# Patient Record
Sex: FEMALE | HISPANIC OR LATINO | Employment: OTHER | URBAN - NONMETROPOLITAN AREA
[De-identification: names, ages, dates, MRNs, and addresses within clinical notes are randomized per-mention and may not be internally consistent; named-entity substitution may affect disease eponyms.]

---

## 2017-01-18 DIAGNOSIS — I10 ESSENTIAL HYPERTENSION: ICD-10-CM

## 2017-01-18 DIAGNOSIS — E78.2 MIXED HYPERLIPIDEMIA: ICD-10-CM

## 2017-01-19 RX ORDER — LISINOPRIL 2.5 MG/1
2.5 TABLET ORAL DAILY
Qty: 90 TABLET | Refills: 3 | Status: SHIPPED | OUTPATIENT
Start: 2017-01-19 | End: 2017-08-24 | Stop reason: SDUPTHER

## 2017-06-06 ENCOUNTER — OFFICE VISIT (OUTPATIENT)
Dept: NEUROSURGERY | Facility: CLINIC | Age: 72
End: 2017-06-06

## 2017-06-06 VITALS
DIASTOLIC BLOOD PRESSURE: 78 MMHG | HEIGHT: 63 IN | WEIGHT: 164 LBS | SYSTOLIC BLOOD PRESSURE: 148 MMHG | BODY MASS INDEX: 29.06 KG/M2

## 2017-06-06 DIAGNOSIS — N28.1 RENAL CYST: ICD-10-CM

## 2017-06-06 DIAGNOSIS — E66.3 OVERWEIGHT (BMI 25.0-29.9): ICD-10-CM

## 2017-06-06 DIAGNOSIS — M25.551 RIGHT HIP PAIN: Primary | ICD-10-CM

## 2017-06-06 PROCEDURE — 99204 OFFICE O/P NEW MOD 45 MIN: CPT | Performed by: NURSE PRACTITIONER

## 2017-06-06 RX ORDER — LISINOPRIL 2.5 MG/1
TABLET ORAL
Refills: 3 | COMMUNITY
Start: 2017-04-24 | End: 2019-11-15

## 2017-06-06 RX ORDER — CLOPIDOGREL BISULFATE 75 MG/1
TABLET ORAL
Refills: 3 | COMMUNITY
Start: 2017-04-24

## 2017-06-06 RX ORDER — BUPROPION HYDROCHLORIDE 150 MG/1
TABLET, EXTENDED RELEASE ORAL
Refills: 3 | COMMUNITY
Start: 2017-04-19

## 2017-06-06 RX ORDER — ISOSORBIDE MONONITRATE 30 MG/1
TABLET, EXTENDED RELEASE ORAL
Refills: 3 | COMMUNITY
Start: 2017-05-03

## 2017-06-06 RX ORDER — ATORVASTATIN CALCIUM 40 MG/1
TABLET, FILM COATED ORAL
Refills: 3 | COMMUNITY
Start: 2017-05-03

## 2017-08-02 ENCOUNTER — TELEPHONE (OUTPATIENT)
Dept: CARDIOLOGY | Age: 72
End: 2017-08-02

## 2017-08-24 ENCOUNTER — OFFICE VISIT (OUTPATIENT)
Dept: CARDIOLOGY | Age: 72
End: 2017-08-24
Payer: MEDICARE

## 2017-08-24 VITALS
HEART RATE: 66 BPM | DIASTOLIC BLOOD PRESSURE: 78 MMHG | SYSTOLIC BLOOD PRESSURE: 120 MMHG | WEIGHT: 164 LBS | HEIGHT: 63 IN | BODY MASS INDEX: 29.06 KG/M2

## 2017-08-24 DIAGNOSIS — I25.10 CORONARY ARTERY DISEASE INVOLVING NATIVE CORONARY ARTERY OF NATIVE HEART WITHOUT ANGINA PECTORIS: ICD-10-CM

## 2017-08-24 DIAGNOSIS — I20.9 ANGINA PECTORIS (HCC): ICD-10-CM

## 2017-08-24 DIAGNOSIS — I10 ESSENTIAL HYPERTENSION: ICD-10-CM

## 2017-08-24 DIAGNOSIS — E78.2 MIXED HYPERLIPIDEMIA: ICD-10-CM

## 2017-08-24 PROCEDURE — 3017F COLORECTAL CA SCREEN DOC REV: CPT | Performed by: INTERNAL MEDICINE

## 2017-08-24 PROCEDURE — 4040F PNEUMOC VAC/ADMIN/RCVD: CPT | Performed by: INTERNAL MEDICINE

## 2017-08-24 PROCEDURE — 1036F TOBACCO NON-USER: CPT | Performed by: INTERNAL MEDICINE

## 2017-08-24 PROCEDURE — 99213 OFFICE O/P EST LOW 20 MIN: CPT | Performed by: INTERNAL MEDICINE

## 2017-08-24 PROCEDURE — 3014F SCREEN MAMMO DOC REV: CPT | Performed by: INTERNAL MEDICINE

## 2017-08-24 PROCEDURE — G8598 ASA/ANTIPLAT THER USED: HCPCS | Performed by: INTERNAL MEDICINE

## 2017-08-24 PROCEDURE — G8419 CALC BMI OUT NRM PARAM NOF/U: HCPCS | Performed by: INTERNAL MEDICINE

## 2017-08-24 PROCEDURE — G8400 PT W/DXA NO RESULTS DOC: HCPCS | Performed by: INTERNAL MEDICINE

## 2017-08-24 PROCEDURE — 1090F PRES/ABSN URINE INCON ASSESS: CPT | Performed by: INTERNAL MEDICINE

## 2017-08-24 PROCEDURE — 1123F ACP DISCUSS/DSCN MKR DOCD: CPT | Performed by: INTERNAL MEDICINE

## 2017-08-24 PROCEDURE — G8427 DOCREV CUR MEDS BY ELIG CLIN: HCPCS | Performed by: INTERNAL MEDICINE

## 2017-08-24 RX ORDER — ATORVASTATIN CALCIUM 40 MG/1
40 TABLET, FILM COATED ORAL DAILY
Qty: 90 TABLET | Refills: 3 | Status: SHIPPED | OUTPATIENT
Start: 2017-08-24 | End: 2018-05-21 | Stop reason: SDUPTHER

## 2017-08-24 RX ORDER — BUPROPION HYDROCHLORIDE 150 MG/1
TABLET, EXTENDED RELEASE ORAL
COMMUNITY
Start: 2017-07-15 | End: 2021-10-27

## 2017-08-24 RX ORDER — LISINOPRIL 2.5 MG/1
2.5 TABLET ORAL DAILY
Qty: 90 TABLET | Refills: 3 | Status: SHIPPED | OUTPATIENT
Start: 2017-08-24 | End: 2018-08-24 | Stop reason: SDUPTHER

## 2017-08-24 RX ORDER — ISOSORBIDE MONONITRATE 30 MG/1
30 TABLET, EXTENDED RELEASE ORAL DAILY
Qty: 90 TABLET | Refills: 3 | Status: SHIPPED | OUTPATIENT
Start: 2017-08-24 | End: 2018-05-21 | Stop reason: SDUPTHER

## 2017-08-24 RX ORDER — NITROGLYCERIN 0.4 MG/1
0.4 TABLET SUBLINGUAL EVERY 5 MIN PRN
Qty: 25 TABLET | Refills: 5 | Status: SHIPPED | OUTPATIENT
Start: 2017-08-24 | End: 2019-09-24 | Stop reason: SDUPTHER

## 2017-08-24 RX ORDER — CLOPIDOGREL BISULFATE 75 MG/1
75 TABLET ORAL DAILY
Qty: 90 TABLET | Refills: 3 | Status: SHIPPED | OUTPATIENT
Start: 2017-08-24 | End: 2018-05-21 | Stop reason: SDUPTHER

## 2017-09-12 ENCOUNTER — HOSPITAL ENCOUNTER (OUTPATIENT)
Dept: NON INVASIVE DIAGNOSTICS | Age: 72
Discharge: HOME | End: 2017-09-12
Payer: MEDICARE

## 2017-09-12 LAB
LV EF: 50 %
LVEF MODALITY: NORMAL

## 2017-09-12 PROCEDURE — 93350 STRESS TTE ONLY: CPT

## 2017-10-20 DIAGNOSIS — I10 ESSENTIAL HYPERTENSION: ICD-10-CM

## 2017-10-20 DIAGNOSIS — I25.10 CORONARY ARTERY DISEASE INVOLVING NATIVE CORONARY ARTERY OF NATIVE HEART WITHOUT ANGINA PECTORIS: ICD-10-CM

## 2017-10-20 DIAGNOSIS — E78.2 MIXED HYPERLIPIDEMIA: ICD-10-CM

## 2017-10-20 RX ORDER — ATORVASTATIN CALCIUM 40 MG/1
40 TABLET, FILM COATED ORAL DAILY
Qty: 90 TABLET | Refills: 0 | Status: SHIPPED | OUTPATIENT
Start: 2017-10-20 | End: 2018-08-24 | Stop reason: SDUPTHER

## 2017-10-20 RX ORDER — ISOSORBIDE MONONITRATE 30 MG/1
30 TABLET, EXTENDED RELEASE ORAL DAILY
Qty: 90 TABLET | Refills: 2 | Status: SHIPPED | OUTPATIENT
Start: 2017-10-20 | End: 2018-08-24 | Stop reason: SDUPTHER

## 2017-10-20 RX ORDER — CLOPIDOGREL BISULFATE 75 MG/1
75 TABLET ORAL DAILY
Qty: 90 TABLET | Refills: 2 | Status: SHIPPED | OUTPATIENT
Start: 2017-10-20 | End: 2018-07-09 | Stop reason: SDUPTHER

## 2018-05-21 ENCOUNTER — HOSPITAL ENCOUNTER (OUTPATIENT)
Dept: PAIN MANAGEMENT | Age: 73
Discharge: HOME | End: 2018-05-21
Payer: MEDICARE

## 2018-05-21 VITALS
DIASTOLIC BLOOD PRESSURE: 81 MMHG | HEIGHT: 63 IN | RESPIRATION RATE: 18 BRPM | HEART RATE: 66 BPM | BODY MASS INDEX: 28 KG/M2 | SYSTOLIC BLOOD PRESSURE: 138 MMHG | OXYGEN SATURATION: 98 % | TEMPERATURE: 97.9 F | WEIGHT: 158 LBS

## 2018-05-21 DIAGNOSIS — M70.61 TROCHANTERIC BURSITIS OF RIGHT HIP: ICD-10-CM

## 2018-05-21 DIAGNOSIS — G89.29 CHRONIC RIGHT-SIDED LOW BACK PAIN WITHOUT SCIATICA: ICD-10-CM

## 2018-05-21 DIAGNOSIS — M53.3 SACROILIAC JOINT DYSFUNCTION OF RIGHT SIDE: ICD-10-CM

## 2018-05-21 DIAGNOSIS — M62.830 MUSCLE SPASM OF BACK: ICD-10-CM

## 2018-05-21 DIAGNOSIS — M54.50 CHRONIC RIGHT-SIDED LOW BACK PAIN WITHOUT SCIATICA: ICD-10-CM

## 2018-05-21 PROCEDURE — 99214 OFFICE O/P EST MOD 30 MIN: CPT | Performed by: NURSE PRACTITIONER

## 2018-05-21 PROCEDURE — 99205 OFFICE O/P NEW HI 60 MIN: CPT

## 2018-05-21 RX ORDER — EMOLLIENT BASE
CREAM (GRAM) TOPICAL
Qty: 300 G | Refills: 5 | Status: SHIPPED | OUTPATIENT
Start: 2018-05-21 | End: 2020-09-22

## 2018-05-21 RX ORDER — HYDROCODONE BITARTRATE AND ACETAMINOPHEN 5; 325 MG/1; MG/1
TABLET ORAL
Qty: 60 TABLET | Refills: 0 | Status: SHIPPED | OUTPATIENT
Start: 2018-05-21 | End: 2018-06-20

## 2018-05-21 RX ORDER — GABAPENTIN 100 MG/1
100 CAPSULE ORAL 3 TIMES DAILY
Qty: 90 CAPSULE | Refills: 2 | Status: SHIPPED | OUTPATIENT
Start: 2018-05-21 | End: 2019-09-24

## 2018-07-03 ENCOUNTER — TELEPHONE (OUTPATIENT)
Dept: CARDIOLOGY | Age: 73
End: 2018-07-03

## 2018-07-09 DIAGNOSIS — I25.10 CORONARY ARTERY DISEASE INVOLVING NATIVE CORONARY ARTERY OF NATIVE HEART WITHOUT ANGINA PECTORIS: ICD-10-CM

## 2018-07-09 DIAGNOSIS — I10 ESSENTIAL HYPERTENSION: ICD-10-CM

## 2018-07-09 RX ORDER — CLOPIDOGREL BISULFATE 75 MG/1
75 TABLET ORAL DAILY
Qty: 90 TABLET | Refills: 3 | Status: SHIPPED | OUTPATIENT
Start: 2018-07-09 | End: 2018-08-24 | Stop reason: SDUPTHER

## 2018-07-16 ENCOUNTER — HOSPITAL ENCOUNTER (OUTPATIENT)
Dept: PAIN MANAGEMENT | Age: 73
Discharge: HOME | End: 2018-07-16
Payer: MEDICARE

## 2018-07-16 VITALS
HEIGHT: 63 IN | BODY MASS INDEX: 27.29 KG/M2 | SYSTOLIC BLOOD PRESSURE: 137 MMHG | OXYGEN SATURATION: 98 % | HEART RATE: 67 BPM | DIASTOLIC BLOOD PRESSURE: 73 MMHG | TEMPERATURE: 96.8 F | WEIGHT: 154 LBS

## 2018-07-16 DIAGNOSIS — M70.61 TROCHANTERIC BURSITIS OF RIGHT HIP: ICD-10-CM

## 2018-07-16 DIAGNOSIS — G89.29 CHRONIC RIGHT-SIDED LOW BACK PAIN WITHOUT SCIATICA: ICD-10-CM

## 2018-07-16 DIAGNOSIS — M54.50 CHRONIC RIGHT-SIDED LOW BACK PAIN WITHOUT SCIATICA: ICD-10-CM

## 2018-07-16 DIAGNOSIS — M53.3 SACROILIAC JOINT DYSFUNCTION OF RIGHT SIDE: ICD-10-CM

## 2018-07-16 DIAGNOSIS — M62.830 MUSCLE SPASM OF BACK: ICD-10-CM

## 2018-07-16 LAB — SUMMARY COMPLIANCE DRUG ANAL, UR: NORMAL ML

## 2018-07-16 PROCEDURE — 80307 DRUG TEST PRSMV CHEM ANLYZR: CPT

## 2018-07-16 PROCEDURE — 99213 OFFICE O/P EST LOW 20 MIN: CPT

## 2018-07-16 PROCEDURE — 99213 OFFICE O/P EST LOW 20 MIN: CPT | Performed by: NURSE PRACTITIONER

## 2018-07-16 ASSESSMENT — PAIN DESCRIPTION - LOCATION: LOCATION: BACK

## 2018-07-16 ASSESSMENT — PAIN DESCRIPTION - PAIN TYPE: TYPE: CHRONIC PAIN

## 2018-07-16 ASSESSMENT — PAIN SCALES - GENERAL: PAINLEVEL_OUTOF10: 2

## 2018-07-16 NOTE — PROGRESS NOTES
Medication Sig Dispense Refill    clopidogrel (PLAVIX) 75 MG tablet TAKE 1 TABLET BY MOUTH DAILY 90 tablet 3    Cream Base CREA [place] Pain Cream #4 (Please remove Ibuprofen) -  Apply to affected area 3-4 times a day 300 g 5    gabapentin (NEURONTIN) 100 MG capsule Take 1 capsule by mouth 3 times daily for 90 days. . 90 capsule 2    isosorbide mononitrate (IMDUR) 30 MG extended release tablet TAKE 1 TABLET BY MOUTH DAILY 90 tablet 2    atorvastatin (LIPITOR) 40 MG tablet TAKE 1 TABLET BY MOUTH DAILY 90 tablet 0    buPROPion (WELLBUTRIN SR) 150 MG extended release tablet       lisinopril (PRINIVIL;ZESTRIL) 2.5 MG tablet Take 1 tablet by mouth daily 90 tablet 3    metoprolol tartrate (LOPRESSOR) 25 MG tablet Take 0.5 tablets by mouth 2 times daily 90 tablet 3    nitroGLYCERIN (NITROSTAT) 0.4 MG SL tablet Place 1 tablet under the tongue every 5 minutes as needed for Chest pain 25 tablet 5    famotidine (PEPCID) 20 MG tablet Take 20 mg by mouth daily. No current facility-administered medications for this encounter. Social History    Social History   Substance Use Topics    Smoking status: Former Smoker     Types: Cigarettes     Quit date: 8/1/2012    Smokeless tobacco: Never Used    Alcohol use No         Family History  family history includes Cancer in her sister; Heart Disease in her father and mother; Stroke in her father.     Review of Systems:    ROS    14 point ROS negative besides that noted in HPI    Complains of constipation: negative    Current Pain Assessment:   Pain Assessment  Pain Assessment: 0-10  Pain Level: 2  Pain Type: Chronic pain  Pain Location: Back  Pain Radiating Towards: radiates to right leg  pt states she completed pt cant say if helped because she is better less pain      Recent Imaging: MRI of lumbar spine 4/28/17    Physical Exam:    Vitals:    07/16/18 1039   BP: 137/73   Pulse: 67   Temp: 96.8 °F (36 °C)   SpO2: 98%   Weight: 154 lb (69.9 kg)   Height: 5' risk of tolerance, dependence and alternative treatments. Discussed the growing epidemic in the [place] with the overprescribing and at times the abuse of narcotics. Discussed the detrimental effects of long term narcotic use. Patient encouraged to set daily goals of exercising and decreasing daily narcotic intake. Discussed with the patient about the development of hyperalgesia with long term narcotic intake.      CC:  DO Octavia Garland APRN, 7/16/2018 at 11:05 AM

## 2018-08-24 ENCOUNTER — OFFICE VISIT (OUTPATIENT)
Dept: CARDIOLOGY | Age: 73
End: 2018-08-24
Payer: MEDICARE

## 2018-08-24 VITALS
BODY MASS INDEX: 28.16 KG/M2 | HEART RATE: 68 BPM | DIASTOLIC BLOOD PRESSURE: 80 MMHG | WEIGHT: 153 LBS | HEIGHT: 62 IN | SYSTOLIC BLOOD PRESSURE: 130 MMHG

## 2018-08-24 DIAGNOSIS — I25.10 CORONARY ARTERY DISEASE INVOLVING NATIVE CORONARY ARTERY OF NATIVE HEART WITHOUT ANGINA PECTORIS: ICD-10-CM

## 2018-08-24 DIAGNOSIS — I10 ESSENTIAL HYPERTENSION: Primary | ICD-10-CM

## 2018-08-24 DIAGNOSIS — E78.2 MIXED HYPERLIPIDEMIA: ICD-10-CM

## 2018-08-24 PROBLEM — N28.1 RENAL CYST: Status: ACTIVE | Noted: 2017-06-06

## 2018-08-24 PROBLEM — E66.3 OVERWEIGHT (BMI 25.0-29.9): Status: ACTIVE | Noted: 2017-06-06

## 2018-08-24 PROBLEM — M25.551 RIGHT HIP PAIN: Status: ACTIVE | Noted: 2017-06-06

## 2018-08-24 PROCEDURE — 1101F PT FALLS ASSESS-DOCD LE1/YR: CPT | Performed by: NURSE PRACTITIONER

## 2018-08-24 PROCEDURE — 1123F ACP DISCUSS/DSCN MKR DOCD: CPT | Performed by: NURSE PRACTITIONER

## 2018-08-24 PROCEDURE — 4040F PNEUMOC VAC/ADMIN/RCVD: CPT | Performed by: NURSE PRACTITIONER

## 2018-08-24 PROCEDURE — 1090F PRES/ABSN URINE INCON ASSESS: CPT | Performed by: NURSE PRACTITIONER

## 2018-08-24 PROCEDURE — 99213 OFFICE O/P EST LOW 20 MIN: CPT | Performed by: NURSE PRACTITIONER

## 2018-08-24 PROCEDURE — G8400 PT W/DXA NO RESULTS DOC: HCPCS | Performed by: NURSE PRACTITIONER

## 2018-08-24 PROCEDURE — G8598 ASA/ANTIPLAT THER USED: HCPCS | Performed by: NURSE PRACTITIONER

## 2018-08-24 PROCEDURE — 3017F COLORECTAL CA SCREEN DOC REV: CPT | Performed by: NURSE PRACTITIONER

## 2018-08-24 PROCEDURE — 93000 ELECTROCARDIOGRAM COMPLETE: CPT | Performed by: NURSE PRACTITIONER

## 2018-08-24 PROCEDURE — 1036F TOBACCO NON-USER: CPT | Performed by: NURSE PRACTITIONER

## 2018-08-24 PROCEDURE — G8419 CALC BMI OUT NRM PARAM NOF/U: HCPCS | Performed by: NURSE PRACTITIONER

## 2018-08-24 PROCEDURE — G8427 DOCREV CUR MEDS BY ELIG CLIN: HCPCS | Performed by: NURSE PRACTITIONER

## 2018-08-24 RX ORDER — ISOSORBIDE MONONITRATE 30 MG/1
30 TABLET, EXTENDED RELEASE ORAL DAILY
Qty: 90 TABLET | Refills: 2 | Status: SHIPPED | OUTPATIENT
Start: 2018-08-24 | End: 2019-06-26 | Stop reason: SDUPTHER

## 2018-08-24 RX ORDER — CLOPIDOGREL BISULFATE 75 MG/1
75 TABLET ORAL DAILY
Qty: 90 TABLET | Refills: 3 | Status: SHIPPED | OUTPATIENT
Start: 2018-08-24 | End: 2019-09-24 | Stop reason: SDUPTHER

## 2018-08-24 RX ORDER — LISINOPRIL 2.5 MG/1
2.5 TABLET ORAL DAILY
Qty: 90 TABLET | Refills: 3 | Status: SHIPPED | OUTPATIENT
Start: 2018-08-24 | End: 2019-09-24 | Stop reason: SINTOL

## 2018-08-24 RX ORDER — ATORVASTATIN CALCIUM 40 MG/1
40 TABLET, FILM COATED ORAL DAILY
Qty: 90 TABLET | Refills: 3 | Status: SHIPPED | OUTPATIENT
Start: 2018-08-24 | End: 2019-09-24 | Stop reason: SDUPTHER

## 2018-08-24 NOTE — PROGRESS NOTES
Dear Jordan Cedeño DO & Gill Oden DO,    Thank you for allowing me to participate in the care of Ms. Stacey Way. She presents today at the [address] in [place]. As you know, Ms. Roshni Dudley is a 71 y.o. female with history of hypertension, hyperlipidemia,  and CAD who presents with the chief complaint of yearly follow up. She is a patient of Dr. Shayan Tuttle. HTN-AceI, BB-Runs well at home   HLD-statin, PCP manages  CAD/ CABG  4V-statin, plavix, Imdur. Stress test  was negative. Last cath . No ASA due to allergy    She otherwise denies chest pain, SOA, KWAN, PND, orthopnea, syncope or near syncope. She has no other complaints. Review of Systems    Constitutional: Negative for fever, chills, diaphoresis, activity change, appetite change, fatigue and unexpected weight change. Eyes: Negative for photophobia, pain, redness and visual disturbance. Respiratory: Negative for apnea, cough, chest tightness, shortness of breath, wheezing and stridor. Cardiovascular: Negative for chest pain, palpitations and leg swelling. Gastrointestinal: Negative for abdominal distention. Genitourinary: Negative for dysuria, urgency and frequency. Musculoskeletal: Negative for myalgias, arthralgias and gait problem. Skin: Negative for color change, pallor, rash and wound. Neurological: Negative for dizziness, tremors, speech difficulty, weakness and numbness. +headaches  Hematological: Does not bruise/bleed easily. Psychiatric/Behavioral: Negative.         Past Medical History:   Diagnosis Date    CAD (coronary artery disease)     GERD (gastroesophageal reflux disease)     Hyperlipidemia     PCP manages cholesterol/labs    Hypertension     Substance abuse     Tobacco       Past Surgical History:   Procedure Laterality Date    CARDIAC CATHETERIZATION  12  MDL    EF  60%    CARDIAC CATHETERIZATION  12  MDL    with stenting to RCA     SECTION  1973    CORONARY ARTERY BYPASS GRAFT  8/2/12    LIMA to LAD, sequential SVG to OM and Diag, SVG to PDA    HYSTERECTOMY  1984    Partial    OVARIAN CYST REMOVAL  1990       Family History   Problem Relation Age of Onset    Heart Disease Mother     Heart Disease Father     Stroke Father     Cancer Sister        Social History     Social History    Marital status:      Spouse name: N/A    Number of children: N/A    Years of education: N/A     Occupational History    Not on file.      Social History Main Topics    Smoking status: Former Smoker     Types: Cigarettes     Quit date: 8/1/2012    Smokeless tobacco: Never Used    Alcohol use No    Drug use: No    Sexual activity: Not Currently     Other Topics Concern    Not on file     Social History Narrative    No narrative on file       Allergies   Allergen Reactions    Asa Arthritis Strength-Antacid [Aspirin Buff, Al Hyd-Mg Hyd] Swelling    Ciprofloxacin     Demerol Swelling    Ibuprofen Swelling    Novocain [Procaine] Swelling         Current Outpatient Prescriptions:     clopidogrel (PLAVIX) 75 MG tablet, TAKE 1 TABLET BY MOUTH DAILY, Disp: 90 tablet, Rfl: 3    Cream Base CREA, [place] Pain Cream #4 (Please remove Ibuprofen) -  Apply to affected area 3-4 times a day, Disp: 300 g, Rfl: 5    isosorbide mononitrate (IMDUR) 30 MG extended release tablet, TAKE 1 TABLET BY MOUTH DAILY, Disp: 90 tablet, Rfl: 2    atorvastatin (LIPITOR) 40 MG tablet, TAKE 1 TABLET BY MOUTH DAILY, Disp: 90 tablet, Rfl: 0    buPROPion (WELLBUTRIN SR) 150 MG extended release tablet, , Disp: , Rfl:     lisinopril (PRINIVIL;ZESTRIL) 2.5 MG tablet, Take 1 tablet by mouth daily, Disp: 90 tablet, Rfl: 3    metoprolol tartrate (LOPRESSOR) 25 MG tablet, Take 0.5 tablets by mouth 2 times daily, Disp: 90 tablet, Rfl: 3    nitroGLYCERIN (NITROSTAT) 0.4 MG SL tablet, Place 1 tablet under the tongue every 5 minutes as needed for Chest pain, Disp: 25 tablet, Rfl: 5    famotidine (PEPCID) 20 MG tablet, Take 20 mg by mouth daily. , Disp: , Rfl:     gabapentin (NEURONTIN) 100 MG capsule, Take 1 capsule by mouth 3 times daily for 90 days. ., Disp: 90 capsule, Rfl: 2    PE:  Vitals:    08/24/18 1051   BP: 130/80   Pulse: 68       Estimated body mass index is 27.98 kg/m² as calculated from the following:    Height as of this encounter: 5' 2\" (1.575 m). Weight as of this encounter: 153 lb (69.4 kg). Constitutional: She is oriented to person, place, and time. She appears well-developed and well-nourished in no acute distress. Head: Normocephalic and atraumatic. Neck:  Neck supple without JVD present. Cardiovascular: Normal rate, regular rhythm, normal heart sounds. no murmur ascultated. No gallop and no friction rub.  no carotid bruits. no peripheral edema. Pulmonary/Chest:  Lungs clear to auscultation bilaterally without evidence of respiratory distress. She without wheezes. She without rales or ronchi. Musculoskeletal: Normal range of motion. Gait is normal no assitive device. Neurological: She is alert and oriented to person, place, and time. Skin: Skin is warm and dry without rash or pallor. Psychiatric: She has a normal mood and affect. Her behavior is normal. Thought content normal.     Lab Results   Component Value Date    CREATININE 0.8 08/21/2012    CREATININE 0.6 08/08/2012    CREATININE 0.6 08/07/2012    HGB 11.7 08/21/2012    HGB 8.8 08/08/2012    HGB 9.2 08/07/2012       ECG 08/24/18  Normal sinus rhythm and 68 BPM, nonspecific T abnormality         Assessment    1. Essential hypertension    2. Coronary artery disease involving native coronary artery of native heart without angina pectoris    3.  Mixed hyperlipidemia          Plan:      HTN-controlled, no change  HLD-PCP manages  CAD-allergic to ASA, on plavix, statin      Disposition - RTC in 12 months Dr. Marine Jones  or sooner if needed    Please do not hesitate to contact me for any

## 2019-06-11 DIAGNOSIS — R71.8 ABNORMAL RED BLOOD CELLS: Primary | ICD-10-CM

## 2019-06-14 ENCOUNTER — APPOINTMENT (OUTPATIENT)
Dept: LAB | Facility: HOSPITAL | Age: 74
End: 2019-06-14

## 2019-06-14 ENCOUNTER — CONSULT (OUTPATIENT)
Dept: ONCOLOGY | Facility: CLINIC | Age: 74
End: 2019-06-14

## 2019-06-14 VITALS
RESPIRATION RATE: 16 BRPM | TEMPERATURE: 98.1 F | DIASTOLIC BLOOD PRESSURE: 82 MMHG | BODY MASS INDEX: 30 KG/M2 | HEART RATE: 80 BPM | HEIGHT: 62 IN | OXYGEN SATURATION: 99 % | SYSTOLIC BLOOD PRESSURE: 142 MMHG

## 2019-06-14 DIAGNOSIS — R71.8 RED BLOOD CELL ABNORMALITY: Primary | ICD-10-CM

## 2019-06-14 DIAGNOSIS — Z13.30 ENCOUNTER FOR BEHAVIORAL HEALTH SCREENING: ICD-10-CM

## 2019-06-14 DIAGNOSIS — D50.9 IRON DEFICIENCY ANEMIA, UNSPECIFIED IRON DEFICIENCY ANEMIA TYPE: Primary | ICD-10-CM

## 2019-06-14 DIAGNOSIS — R71.8 ABNORMAL RED BLOOD CELLS: Primary | ICD-10-CM

## 2019-06-14 LAB
ALBUMIN SERPL-MCNC: 4.4 G/DL (ref 3.5–5)
ALBUMIN/GLOB SERPL: 1.5 G/DL (ref 1.1–2.5)
ALP SERPL-CCNC: 145 U/L (ref 24–120)
ALT SERPL W P-5'-P-CCNC: 31 U/L (ref 0–54)
ANION GAP SERPL CALCULATED.3IONS-SCNC: 8 MMOL/L (ref 4–13)
AST SERPL-CCNC: 38 U/L (ref 7–45)
BASOPHILS # BLD AUTO: 0.06 10*3/MM3 (ref 0–0.2)
BASOPHILS NFR BLD AUTO: 0.8 % (ref 0–2)
BILIRUB SERPL-MCNC: 0.6 MG/DL (ref 0.1–1)
BUN BLD-MCNC: 17 MG/DL (ref 5–21)
BUN/CREAT SERPL: 22.1 (ref 7–25)
CALCIUM SPEC-SCNC: 9.5 MG/DL (ref 8.4–10.4)
CHLORIDE SERPL-SCNC: 106 MMOL/L (ref 98–110)
CO2 SERPL-SCNC: 26 MMOL/L (ref 24–31)
CREAT BLD-MCNC: 0.77 MG/DL (ref 0.5–1.4)
DEPRECATED RDW RBC AUTO: 37.8 FL (ref 40–54)
EOSINOPHIL # BLD AUTO: 0.28 10*3/MM3 (ref 0–0.7)
EOSINOPHIL NFR BLD AUTO: 3.7 % (ref 0–4)
ERYTHROCYTE [DISTWIDTH] IN BLOOD BY AUTOMATED COUNT: 16 % (ref 12–15)
FERRITIN SERPL-MCNC: 198 NG/ML (ref 11.1–264)
FOLATE SERPL-MCNC: 12.2 NG/ML (ref 4.78–24.2)
GFR SERPL CREATININE-BSD FRML MDRD: 74 ML/MIN/1.73
GFR SERPL CREATININE-BSD FRML MDRD: 89 ML/MIN/1.73
GLOBULIN UR ELPH-MCNC: 3 GM/DL
GLUCOSE BLD-MCNC: 120 MG/DL (ref 70–100)
HCT VFR BLD AUTO: 40.3 % (ref 37–47)
HGB BLD-MCNC: 12.7 G/DL (ref 12–16)
HOLD SPECIMEN: NORMAL
IRON 24H UR-MRATE: 89 MCG/DL (ref 42–180)
IRON SATN MFR SERPL: 31 % (ref 20–45)
LYMPHOCYTES # BLD AUTO: 1.61 10*3/MM3 (ref 0.72–4.86)
LYMPHOCYTES NFR BLD AUTO: 21.5 % (ref 15–45)
MCH RBC QN AUTO: 21.7 PG (ref 28–32)
MCHC RBC AUTO-ENTMCNC: 31.5 G/DL (ref 33–36)
MCV RBC AUTO: 68.9 FL (ref 82–98)
MONOCYTES # BLD AUTO: 0.46 10*3/MM3 (ref 0.19–1.3)
MONOCYTES NFR BLD AUTO: 6.1 % (ref 4–12)
NEUTROPHILS # BLD AUTO: 5.07 10*3/MM3 (ref 1.87–8.4)
NEUTROPHILS NFR BLD AUTO: 67.6 % (ref 39–78)
PLATELET # BLD AUTO: 299 10*3/MM3 (ref 130–400)
PMV BLD AUTO: 9.6 FL (ref 6–12)
POTASSIUM BLD-SCNC: 4.4 MMOL/L (ref 3.5–5.3)
PROT SERPL-MCNC: 7.4 G/DL (ref 6.3–8.7)
RBC # BLD AUTO: 5.85 10*6/MM3 (ref 4.2–5.4)
SODIUM BLD-SCNC: 140 MMOL/L (ref 135–145)
TIBC SERPL-MCNC: 285 MCG/DL (ref 225–420)
VIT B12 BLD-MCNC: 948 PG/ML (ref 239–931)
WBC NRBC COR # BLD: 7.5 10*3/MM3 (ref 4.8–10.8)
WHOLE BLOOD HOLD SPECIMEN: NORMAL

## 2019-06-14 PROCEDURE — 80053 COMPREHEN METABOLIC PANEL: CPT | Performed by: INTERNAL MEDICINE

## 2019-06-14 PROCEDURE — 82746 ASSAY OF FOLIC ACID SERUM: CPT | Performed by: INTERNAL MEDICINE

## 2019-06-14 PROCEDURE — 82728 ASSAY OF FERRITIN: CPT | Performed by: INTERNAL MEDICINE

## 2019-06-14 PROCEDURE — 36415 COLL VENOUS BLD VENIPUNCTURE: CPT | Performed by: INTERNAL MEDICINE

## 2019-06-14 PROCEDURE — 82607 VITAMIN B-12: CPT | Performed by: INTERNAL MEDICINE

## 2019-06-14 PROCEDURE — 83550 IRON BINDING TEST: CPT | Performed by: INTERNAL MEDICINE

## 2019-06-14 PROCEDURE — 99205 OFFICE O/P NEW HI 60 MIN: CPT | Performed by: INTERNAL MEDICINE

## 2019-06-14 PROCEDURE — 85025 COMPLETE CBC W/AUTO DIFF WBC: CPT | Performed by: INTERNAL MEDICINE

## 2019-06-14 PROCEDURE — 83540 ASSAY OF IRON: CPT | Performed by: INTERNAL MEDICINE

## 2019-06-14 RX ORDER — FAMOTIDINE 20 MG/1
20 TABLET, FILM COATED ORAL 2 TIMES DAILY
COMMUNITY

## 2019-06-14 RX ORDER — NITROGLYCERIN 0.4 MG/1
0.4 TABLET SUBLINGUAL
COMMUNITY

## 2019-06-25 DIAGNOSIS — D50.9 IRON DEFICIENCY ANEMIA, UNSPECIFIED IRON DEFICIENCY ANEMIA TYPE: Primary | ICD-10-CM

## 2019-06-26 ENCOUNTER — APPOINTMENT (OUTPATIENT)
Dept: LAB | Facility: HOSPITAL | Age: 74
End: 2019-06-26

## 2019-06-26 ENCOUNTER — OFFICE VISIT (OUTPATIENT)
Dept: ONCOLOGY | Facility: CLINIC | Age: 74
End: 2019-06-26

## 2019-06-26 VITALS
BODY MASS INDEX: 28.91 KG/M2 | HEART RATE: 96 BPM | TEMPERATURE: 98.1 F | HEIGHT: 62 IN | RESPIRATION RATE: 16 BRPM | SYSTOLIC BLOOD PRESSURE: 142 MMHG | OXYGEN SATURATION: 98 % | WEIGHT: 157.1 LBS | DIASTOLIC BLOOD PRESSURE: 78 MMHG

## 2019-06-26 DIAGNOSIS — R71.8 ABNORMAL RBC: Primary | ICD-10-CM

## 2019-06-26 DIAGNOSIS — I25.10 CORONARY ARTERY DISEASE INVOLVING NATIVE CORONARY ARTERY OF NATIVE HEART WITHOUT ANGINA PECTORIS: ICD-10-CM

## 2019-06-26 LAB
ALBUMIN SERPL-MCNC: 4.4 G/DL (ref 3.5–5)
ALBUMIN/GLOB SERPL: 1.6 G/DL (ref 1.1–2.5)
ALP SERPL-CCNC: 129 U/L (ref 24–120)
ALT SERPL W P-5'-P-CCNC: 28 U/L (ref 0–54)
ANION GAP SERPL CALCULATED.3IONS-SCNC: 9 MMOL/L (ref 4–13)
AST SERPL-CCNC: 32 U/L (ref 7–45)
BASOPHILS # BLD AUTO: 0.07 10*3/MM3 (ref 0–0.2)
BASOPHILS NFR BLD AUTO: 1 % (ref 0–2)
BILIRUB SERPL-MCNC: 0.6 MG/DL (ref 0.1–1)
BUN BLD-MCNC: 14 MG/DL (ref 5–21)
BUN/CREAT SERPL: 18.7 (ref 7–25)
CALCIUM SPEC-SCNC: 9.3 MG/DL (ref 8.4–10.4)
CHLORIDE SERPL-SCNC: 106 MMOL/L (ref 98–110)
CO2 SERPL-SCNC: 26 MMOL/L (ref 24–31)
CREAT BLD-MCNC: 0.75 MG/DL (ref 0.5–1.4)
DEPRECATED RDW RBC AUTO: 38.9 FL (ref 40–54)
EOSINOPHIL # BLD AUTO: 0.25 10*3/MM3 (ref 0–0.7)
EOSINOPHIL NFR BLD AUTO: 3.4 % (ref 0–4)
ERYTHROCYTE [DISTWIDTH] IN BLOOD BY AUTOMATED COUNT: 16.6 % (ref 12–15)
GFR SERPL CREATININE-BSD FRML MDRD: 76 ML/MIN/1.73
GFR SERPL CREATININE-BSD FRML MDRD: 92 ML/MIN/1.73
GLOBULIN UR ELPH-MCNC: 2.7 GM/DL
GLUCOSE BLD-MCNC: 138 MG/DL (ref 70–100)
HCT VFR BLD AUTO: 39.7 % (ref 37–47)
HGB BLD-MCNC: 12.5 G/DL (ref 12–16)
LYMPHOCYTES # BLD AUTO: 2.33 10*3/MM3 (ref 0.72–4.86)
LYMPHOCYTES NFR BLD AUTO: 31.8 % (ref 15–45)
MCH RBC QN AUTO: 21.8 PG (ref 28–32)
MCHC RBC AUTO-ENTMCNC: 31.5 G/DL (ref 33–36)
MCV RBC AUTO: 69.3 FL (ref 82–98)
MONOCYTES # BLD AUTO: 0.34 10*3/MM3 (ref 0.19–1.3)
MONOCYTES NFR BLD AUTO: 4.6 % (ref 4–12)
NEUTROPHILS # BLD AUTO: 4.31 10*3/MM3 (ref 1.87–8.4)
NEUTROPHILS NFR BLD AUTO: 58.9 % (ref 39–78)
PLATELET # BLD AUTO: 249 10*3/MM3 (ref 130–400)
PMV BLD AUTO: 9.3 FL (ref 6–12)
POTASSIUM BLD-SCNC: 3.6 MMOL/L (ref 3.5–5.3)
PROT SERPL-MCNC: 7.1 G/DL (ref 6.3–8.7)
RBC # BLD AUTO: 5.73 10*6/MM3 (ref 4.2–5.4)
SODIUM BLD-SCNC: 141 MMOL/L (ref 135–145)
WBC NRBC COR # BLD: 7.32 10*3/MM3 (ref 4.8–10.8)

## 2019-06-26 PROCEDURE — 85025 COMPLETE CBC W/AUTO DIFF WBC: CPT | Performed by: INTERNAL MEDICINE

## 2019-06-26 PROCEDURE — 36415 COLL VENOUS BLD VENIPUNCTURE: CPT | Performed by: INTERNAL MEDICINE

## 2019-06-26 PROCEDURE — 80053 COMPREHEN METABOLIC PANEL: CPT | Performed by: INTERNAL MEDICINE

## 2019-06-26 PROCEDURE — 99213 OFFICE O/P EST LOW 20 MIN: CPT | Performed by: INTERNAL MEDICINE

## 2019-06-26 RX ORDER — ISOSORBIDE MONONITRATE 30 MG/1
30 TABLET, EXTENDED RELEASE ORAL DAILY
Qty: 90 TABLET | Refills: 0 | Status: SHIPPED | OUTPATIENT
Start: 2019-06-26 | End: 2019-09-24 | Stop reason: SDUPTHER

## 2019-06-28 ENCOUNTER — APPOINTMENT (OUTPATIENT)
Dept: LAB | Facility: HOSPITAL | Age: 74
End: 2019-06-28

## 2019-09-20 ENCOUNTER — APPOINTMENT (OUTPATIENT)
Dept: LAB | Facility: HOSPITAL | Age: 74
End: 2019-09-20

## 2019-09-24 ENCOUNTER — OFFICE VISIT (OUTPATIENT)
Dept: CARDIOLOGY | Age: 74
End: 2019-09-24
Payer: MEDICARE

## 2019-09-24 VITALS
SYSTOLIC BLOOD PRESSURE: 122 MMHG | HEART RATE: 68 BPM | WEIGHT: 157 LBS | DIASTOLIC BLOOD PRESSURE: 84 MMHG | BODY MASS INDEX: 28.89 KG/M2 | HEIGHT: 62 IN

## 2019-09-24 DIAGNOSIS — E78.2 MIXED HYPERLIPIDEMIA: ICD-10-CM

## 2019-09-24 DIAGNOSIS — I25.10 CORONARY ARTERY DISEASE INVOLVING NATIVE CORONARY ARTERY OF NATIVE HEART WITHOUT ANGINA PECTORIS: ICD-10-CM

## 2019-09-24 DIAGNOSIS — R05.8 ACE-INHIBITOR COUGH: ICD-10-CM

## 2019-09-24 DIAGNOSIS — I10 ESSENTIAL HYPERTENSION: Primary | ICD-10-CM

## 2019-09-24 DIAGNOSIS — T46.4X5A ACE-INHIBITOR COUGH: ICD-10-CM

## 2019-09-24 DIAGNOSIS — I20.9 ANGINA PECTORIS (HCC): ICD-10-CM

## 2019-09-24 PROCEDURE — 93000 ELECTROCARDIOGRAM COMPLETE: CPT | Performed by: CLINICAL NURSE SPECIALIST

## 2019-09-24 PROCEDURE — 99214 OFFICE O/P EST MOD 30 MIN: CPT | Performed by: CLINICAL NURSE SPECIALIST

## 2019-09-24 PROCEDURE — G8427 DOCREV CUR MEDS BY ELIG CLIN: HCPCS | Performed by: CLINICAL NURSE SPECIALIST

## 2019-09-24 PROCEDURE — G8400 PT W/DXA NO RESULTS DOC: HCPCS | Performed by: CLINICAL NURSE SPECIALIST

## 2019-09-24 PROCEDURE — 1090F PRES/ABSN URINE INCON ASSESS: CPT | Performed by: CLINICAL NURSE SPECIALIST

## 2019-09-24 PROCEDURE — G8598 ASA/ANTIPLAT THER USED: HCPCS | Performed by: CLINICAL NURSE SPECIALIST

## 2019-09-24 PROCEDURE — 1123F ACP DISCUSS/DSCN MKR DOCD: CPT | Performed by: CLINICAL NURSE SPECIALIST

## 2019-09-24 PROCEDURE — G8419 CALC BMI OUT NRM PARAM NOF/U: HCPCS | Performed by: CLINICAL NURSE SPECIALIST

## 2019-09-24 PROCEDURE — 4040F PNEUMOC VAC/ADMIN/RCVD: CPT | Performed by: CLINICAL NURSE SPECIALIST

## 2019-09-24 PROCEDURE — 3017F COLORECTAL CA SCREEN DOC REV: CPT | Performed by: CLINICAL NURSE SPECIALIST

## 2019-09-24 PROCEDURE — 1036F TOBACCO NON-USER: CPT | Performed by: CLINICAL NURSE SPECIALIST

## 2019-09-24 RX ORDER — ISOSORBIDE MONONITRATE 30 MG/1
30 TABLET, EXTENDED RELEASE ORAL DAILY
Qty: 90 TABLET | Refills: 0 | Status: SHIPPED | OUTPATIENT
Start: 2019-09-24 | End: 2019-12-20

## 2019-09-24 RX ORDER — ATORVASTATIN CALCIUM 40 MG/1
40 TABLET, FILM COATED ORAL DAILY
Qty: 90 TABLET | Refills: 3 | Status: SHIPPED | OUTPATIENT
Start: 2019-09-24 | End: 2020-09-16

## 2019-09-24 RX ORDER — CLOPIDOGREL BISULFATE 75 MG/1
75 TABLET ORAL DAILY
Qty: 90 TABLET | Refills: 3 | Status: SHIPPED | OUTPATIENT
Start: 2019-09-24 | End: 2020-09-16

## 2019-09-24 RX ORDER — NITROGLYCERIN 0.4 MG/1
0.4 TABLET SUBLINGUAL EVERY 5 MIN PRN
Qty: 25 TABLET | Refills: 5 | Status: SHIPPED | OUTPATIENT
Start: 2019-09-24 | End: 2022-10-27 | Stop reason: SDUPTHER

## 2019-09-24 NOTE — PROGRESS NOTES
and Novocain [procaine]    Review of Systems  Constitutional - no significant activity change, appetite change, or unexpected weight change. No fever, chills or diaphoresis. No fatigue. HEENT - no significant rhinorrhea or epistaxis. No tinnitus or significant hearing loss. Eyes - no sudden vision change or amaurosis. Respiratory - no significant wheezing, stridor, apnea or cough. No dyspnea on exertion or shortness of breath. Cardiovascular - no exertional chest pain, orthopnea or PND. No sensation of arrhythmia or slow heart rate. No claudication or leg edema. Gastrointestinal - no abdominal swelling or pain. No blood in stool. No severe constipation, diarrhea, nausea, or vomiting. Genitourinary - no difficulty urinating, dysuria, frequency, or urgency. No flank pain or hematuria. Musculoskeletal - no back pain, gait disturbance, or myalgia. Skin - no color change or rash. No pallor. No new surgical incision. Neurologic - no speech difficulty, facial asymmetry or lateralizing weakness. No seizures, presyncope, syncope, or significant dizziness. Hematologic - no easy bruising or excessive bleeding. Psychiatric - no severe anxiety or insomnia. No confusion. All other review of systems are negative. Objective  Vital Signs - /84   Pulse 68   Ht 5' 2\" (1.575 m)   Wt 157 lb (71.2 kg)   BMI 28.72 kg/m²   General - Coral is alert, cooperative, and pleasant. Well groomed. No acute distress. Body habitus is normal.  HEENT - The head is normocephalic. No circumoral cyanosis. Dentition is normal.   EYES -  No Xanthelasma, no arcus senilis, no conjunctival hemorrhages or discharge. Neck - Supple, without increased jugular venous pressures. No carotid bruits. No mass. Respiratory - Lungs are clear bilaterally. No wheezes or rales. Normal effort without use of accessory muscles. Cardiovascular - Heart has regular rhythm and rate. No murmurs, rubs or gallops.     + pedal

## 2019-11-14 DIAGNOSIS — D50.9 IRON DEFICIENCY ANEMIA, UNSPECIFIED IRON DEFICIENCY ANEMIA TYPE: Primary | ICD-10-CM

## 2019-11-15 ENCOUNTER — OFFICE VISIT (OUTPATIENT)
Dept: ONCOLOGY | Facility: CLINIC | Age: 74
End: 2019-11-15

## 2019-11-15 ENCOUNTER — APPOINTMENT (OUTPATIENT)
Dept: LAB | Facility: HOSPITAL | Age: 74
End: 2019-11-15

## 2019-11-15 VITALS
HEIGHT: 62 IN | OXYGEN SATURATION: 97 % | BODY MASS INDEX: 28.93 KG/M2 | RESPIRATION RATE: 16 BRPM | HEART RATE: 80 BPM | TEMPERATURE: 97 F | WEIGHT: 157.2 LBS

## 2019-11-15 DIAGNOSIS — D50.9 IRON DEFICIENCY ANEMIA, UNSPECIFIED IRON DEFICIENCY ANEMIA TYPE: Primary | ICD-10-CM

## 2019-11-15 DIAGNOSIS — D64.9 ANEMIA, UNSPECIFIED TYPE: Primary | ICD-10-CM

## 2019-11-15 PROBLEM — I10 HYPERTENSION: Status: ACTIVE | Noted: 2019-11-15

## 2019-11-15 PROBLEM — M54.50 CHRONIC RIGHT-SIDED LOW BACK PAIN WITHOUT SCIATICA: Status: ACTIVE | Noted: 2018-05-21

## 2019-11-15 PROBLEM — I25.10 CAD (CORONARY ARTERY DISEASE): Status: ACTIVE | Noted: 2019-11-15

## 2019-11-15 PROBLEM — M70.61 TROCHANTERIC BURSITIS OF RIGHT HIP: Status: ACTIVE | Noted: 2018-05-21

## 2019-11-15 PROBLEM — E78.5 HYPERLIPIDEMIA: Status: ACTIVE | Noted: 2019-11-15

## 2019-11-15 PROBLEM — M53.3 SACROILIAC JOINT DYSFUNCTION OF RIGHT SIDE: Status: ACTIVE | Noted: 2018-05-21

## 2019-11-15 PROBLEM — G89.29 CHRONIC RIGHT-SIDED LOW BACK PAIN WITHOUT SCIATICA: Status: ACTIVE | Noted: 2018-05-21

## 2019-11-15 PROBLEM — M62.830 MUSCLE SPASM OF BACK: Status: ACTIVE | Noted: 2018-05-21

## 2019-11-15 LAB
ALBUMIN SERPL-MCNC: 4.5 G/DL (ref 3.5–5.2)
ALBUMIN/GLOB SERPL: 1.8 G/DL
ALP SERPL-CCNC: 133 U/L (ref 39–117)
ALT SERPL W P-5'-P-CCNC: 20 U/L (ref 1–33)
ANION GAP SERPL CALCULATED.3IONS-SCNC: 8 MMOL/L (ref 5–15)
AST SERPL-CCNC: 19 U/L (ref 1–32)
BASOPHILS # BLD AUTO: 0.09 10*3/MM3 (ref 0–0.2)
BASOPHILS NFR BLD AUTO: 1.4 % (ref 0–1.5)
BILIRUB SERPL-MCNC: 0.4 MG/DL (ref 0.2–1.2)
BUN BLD-MCNC: 16 MG/DL (ref 8–23)
BUN/CREAT SERPL: 22.9 (ref 7–25)
CALCIUM SPEC-SCNC: 9.2 MG/DL (ref 8.6–10.5)
CHLORIDE SERPL-SCNC: 106 MMOL/L (ref 98–107)
CO2 SERPL-SCNC: 27 MMOL/L (ref 22–29)
CREAT BLD-MCNC: 0.7 MG/DL (ref 0.57–1)
DEPRECATED RDW RBC AUTO: 37.6 FL (ref 37–54)
EOSINOPHIL # BLD AUTO: 0.39 10*3/MM3 (ref 0–0.4)
EOSINOPHIL NFR BLD AUTO: 6.1 % (ref 0.3–6.2)
ERYTHROCYTE [DISTWIDTH] IN BLOOD BY AUTOMATED COUNT: 15.3 % (ref 12.3–15.4)
FERRITIN SERPL-MCNC: 232.9 NG/ML (ref 13–150)
GFR SERPL CREATININE-BSD FRML MDRD: 100 ML/MIN/1.73
GFR SERPL CREATININE-BSD FRML MDRD: 82 ML/MIN/1.73
GLOBULIN UR ELPH-MCNC: 2.5 GM/DL
GLUCOSE BLD-MCNC: 110 MG/DL (ref 65–99)
HCT VFR BLD AUTO: 41.1 % (ref 34–46.6)
HGB BLD-MCNC: 13 G/DL (ref 12–15.9)
HOLD SPECIMEN: NORMAL
HOLD SPECIMEN: NORMAL
IRON 24H UR-MRATE: 93 MCG/DL (ref 37–145)
IRON SATN MFR SERPL: 27 % (ref 20–50)
LYMPHOCYTES # BLD AUTO: 2.03 10*3/MM3 (ref 0.7–3.1)
LYMPHOCYTES NFR BLD AUTO: 31.8 % (ref 19.6–45.3)
MCH RBC QN AUTO: 22.1 PG (ref 26.6–33)
MCHC RBC AUTO-ENTMCNC: 31.6 G/DL (ref 31.5–35.7)
MCV RBC AUTO: 69.9 FL (ref 79–97)
MONOCYTES # BLD AUTO: 0.39 10*3/MM3 (ref 0.1–0.9)
MONOCYTES NFR BLD AUTO: 6.1 % (ref 5–12)
NEUTROPHILS # BLD AUTO: 3.48 10*3/MM3 (ref 1.7–7)
NEUTROPHILS NFR BLD AUTO: 54.4 % (ref 42.7–76)
PLATELET # BLD AUTO: 243 10*3/MM3 (ref 140–450)
PMV BLD AUTO: 9 FL (ref 6–12)
POTASSIUM BLD-SCNC: 4.1 MMOL/L (ref 3.5–5.2)
PROT SERPL-MCNC: 7 G/DL (ref 6–8.5)
RBC # BLD AUTO: 5.88 10*6/MM3 (ref 3.77–5.28)
SODIUM BLD-SCNC: 141 MMOL/L (ref 136–145)
TIBC SERPL-MCNC: 350 MCG/DL (ref 298–536)
TRANSFERRIN SERPL-MCNC: 235 MG/DL (ref 200–360)
WBC NRBC COR # BLD: 6.39 10*3/MM3 (ref 3.4–10.8)

## 2019-11-15 PROCEDURE — 82728 ASSAY OF FERRITIN: CPT | Performed by: INTERNAL MEDICINE

## 2019-11-15 PROCEDURE — 85025 COMPLETE CBC W/AUTO DIFF WBC: CPT | Performed by: INTERNAL MEDICINE

## 2019-11-15 PROCEDURE — 83540 ASSAY OF IRON: CPT | Performed by: INTERNAL MEDICINE

## 2019-11-15 PROCEDURE — 84466 ASSAY OF TRANSFERRIN: CPT | Performed by: INTERNAL MEDICINE

## 2019-11-15 PROCEDURE — 99214 OFFICE O/P EST MOD 30 MIN: CPT | Performed by: INTERNAL MEDICINE

## 2019-11-15 PROCEDURE — 36415 COLL VENOUS BLD VENIPUNCTURE: CPT | Performed by: INTERNAL MEDICINE

## 2019-11-15 PROCEDURE — 80053 COMPREHEN METABOLIC PANEL: CPT | Performed by: INTERNAL MEDICINE

## 2019-12-20 DIAGNOSIS — I25.10 CORONARY ARTERY DISEASE INVOLVING NATIVE CORONARY ARTERY OF NATIVE HEART WITHOUT ANGINA PECTORIS: ICD-10-CM

## 2019-12-20 RX ORDER — ISOSORBIDE MONONITRATE 30 MG/1
30 TABLET, EXTENDED RELEASE ORAL DAILY
Qty: 90 TABLET | Refills: 3 | Status: SHIPPED | OUTPATIENT
Start: 2019-12-20 | End: 2020-09-29 | Stop reason: SDUPTHER

## 2020-05-14 ENCOUNTER — TELEPHONE (OUTPATIENT)
Dept: ONCOLOGY | Facility: CLINIC | Age: 75
End: 2020-05-14

## 2020-05-15 ENCOUNTER — APPOINTMENT (OUTPATIENT)
Dept: LAB | Facility: HOSPITAL | Age: 75
End: 2020-05-15

## 2020-09-16 RX ORDER — CLOPIDOGREL BISULFATE 75 MG/1
75 TABLET ORAL DAILY
Qty: 90 TABLET | Refills: 0 | Status: SHIPPED | OUTPATIENT
Start: 2020-09-16 | End: 2020-09-29 | Stop reason: SDUPTHER

## 2020-09-16 RX ORDER — ATORVASTATIN CALCIUM 40 MG/1
TABLET, FILM COATED ORAL
Qty: 90 TABLET | Refills: 0 | Status: SHIPPED | OUTPATIENT
Start: 2020-09-16 | End: 2020-09-29 | Stop reason: SDUPTHER

## 2020-09-22 ENCOUNTER — OFFICE VISIT (OUTPATIENT)
Dept: CARDIOLOGY | Age: 75
End: 2020-09-22
Payer: MEDICARE

## 2020-09-22 VITALS
SYSTOLIC BLOOD PRESSURE: 144 MMHG | OXYGEN SATURATION: 99 % | HEART RATE: 77 BPM | DIASTOLIC BLOOD PRESSURE: 84 MMHG | WEIGHT: 158 LBS | BODY MASS INDEX: 29.08 KG/M2 | HEIGHT: 62 IN

## 2020-09-22 PROCEDURE — 99213 OFFICE O/P EST LOW 20 MIN: CPT | Performed by: NURSE PRACTITIONER

## 2020-09-22 PROCEDURE — 93000 ELECTROCARDIOGRAM COMPLETE: CPT | Performed by: NURSE PRACTITIONER

## 2020-09-22 ASSESSMENT — ENCOUNTER SYMPTOMS
ABDOMINAL DISTENTION: 0
VOMITING: 0
EYE REDNESS: 0
EYE DISCHARGE: 0
COUGH: 0
TROUBLE SWALLOWING: 0
COLOR CHANGE: 0
WHEEZING: 0
NAUSEA: 0
SHORTNESS OF BREATH: 0
BLOOD IN STOOL: 0
ABDOMINAL PAIN: 0
FACIAL SWELLING: 0

## 2020-09-22 NOTE — PROGRESS NOTES
[address] [place] [address] Cardiology  [address]  [address]  Phone: [phone]  Fax: [phone]    OFFICE VISIT:  2020    Garlandellyn Vaca - : 1945    Reason For Visit:  Ben Beckham is a 69 y.o. female who is here for 1 Year Follow Up; Hypertension; and Hyperlipidemia      HPI    Ms. Tameka Baldwin is a 69 y.o. female with history of coronary artery disease, hypertension, hyperlipidemia, former nicotine dependence, and a family history of CAD who presents with the chief complaint of yearly follow-up. Patient states since last appointment she has been doing well. She does not check her blood pressure at home. She was recently in the ER due to acute vertigo. Patient states this is resolved. Ben Beckham has no exertional chest pain, pressure, burning, tightness or squeezing. No symptomatic tachy- or patel-arrhythmia. No lightheadedness, dizziness, or syncope. No numbness or weakness to suggest cerebrovascular accident or transient ischemic attack. she denies signs of bleeding. Reports no edema or shortness of breath. She denies orthopnea or paroxysmal nocturnal dyspnea. she is sleeping on 1 pillow at night. she has been compliant with her medications. she reports no activity change. PCP follows lipids and labs. Aide Lopez DO is PCP.   Micki Vaca has the following history as recorded in [facility]:    Patient Active Problem List    Diagnosis Date Noted    Chronic right-sided low back pain without sciatica 2018    Sacroiliac joint dysfunction of right side 2018    Trochanteric bursitis of right hip 2018    Muscle spasm of back 2018    Overweight (BMI 25.0-29.9) 2017    Renal cyst 2017    Right hip pain 2017    CAD (coronary artery disease)     Hypertension     Hyperlipidemia      Past Medical History:   Diagnosis Date    CAD (coronary artery disease)     GERD (gastroesophageal reflux disease)     Hyperlipidemia     PCP manages cholesterol/labs    Hypertension     Substance abuse ([facility] [address]     Tobacco     Past Surgical History:   Procedure Laterality Date    CARDIAC CATHETERIZATION  12  MDL    EF  60%    CARDIAC CATHETERIZATION  12  MDL    with stenting to RCA   [facility] CORONARY ARTERY BYPASS GRAFT  12    LIMA to LAD, sequential SVG to OM and Diag, SVG to PDA    HYSTERECTOMY      Partial    OVARIAN CYST REMOVAL       Family History   Problem Relation Age of Onset    Heart Disease Mother     Heart Disease Father     Stroke Father     Cancer Sister      Social History     Tobacco Use    Smoking status: Former Smoker     Types: Cigarettes     Last attempt to quit: 2012     Years since quittin.1    Smokeless tobacco: Never Used   Substance Use Topics    Alcohol use: No      Current Outpatient Medications   Medication Sig Dispense Refill    atorvastatin (LIPITOR) 40 MG tablet TAKE 1 TABLET BY MOUTH ONCE DAILY 90 tablet 0    clopidogrel (PLAVIX) 75 MG tablet TAKE 1 TABLET BY MOUTH DAILY 90 tablet 0    isosorbide mononitrate (IMDUR) 30 MG extended release tablet TAKE 1 TABLET BY MOUTH DAILY 90 tablet 3    nitroGLYCERIN (NITROSTAT) 0.4 MG SL tablet Place 1 tablet under the tongue every 5 minutes as needed for Chest pain 25 tablet 5    metoprolol tartrate (LOPRESSOR) 25 MG tablet Take 0.5 tablets by mouth 2 times daily 90 tablet 3    buPROPion (WELLBUTRIN SR) 150 MG extended release tablet       famotidine (PEPCID) 20 MG tablet Take 20 mg by mouth daily. No current facility-administered medications for this visit. Allergies: Asa arthritis strength-antacid [aspirin buff, al hyd-mg hyd]; Ciprofloxacin; Demerol; Ibuprofen; and Novocain [procaine]    Review of Systems  Review of Systems   Constitutional: Negative for activity change, diaphoresis, fatigue, fever and unexpected weight change. HENT: Negative for facial swelling, nosebleeds and trouble swallowing.     Eyes: Negative for discharge, redness and visual disturbance. Respiratory: Negative for cough, shortness of breath and wheezing. Cardiovascular: Negative for chest pain, palpitations and leg swelling. Gastrointestinal: Negative for abdominal distention, abdominal pain, blood in stool, nausea and vomiting. Endocrine: Negative for cold intolerance and heat intolerance. Genitourinary: Negative for dysuria and hematuria. Musculoskeletal: Negative for gait problem. Skin: Negative for color change, pallor and rash. Neurological: Negative for dizziness, syncope, facial asymmetry and light-headedness. Hematological: Does not bruise/bleed easily. Psychiatric/Behavioral: Negative for behavioral problems and confusion. All other systems reviewed and are negative. Objective  Vital Signs - BP (!) 144/84   Pulse 77   Ht 5' 2\" (1.575 m)   Wt 158 lb (71.7 kg)   SpO2 99%   BMI 28.90 kg/m²   Physical Exam  Vitals signs and nursing note reviewed. Constitutional:       Appearance: She is well-developed. HENT:      Head: Normocephalic and atraumatic. Right Ear: External ear normal.      Left Ear: External ear normal.      Nose: Nose normal.   Eyes:      General:         Right eye: No discharge. Left eye: No discharge. Pupils: Pupils are equal, round, and reactive to light. Neck:      Musculoskeletal: Normal range of motion. No edema. Vascular: No carotid bruit or JVD. Trachea: No tracheal deviation. Cardiovascular:      Rate and Rhythm: Normal rate and regular rhythm. Heart sounds: Normal heart sounds. No murmur. No friction rub. No gallop. Pulmonary:      Effort: Pulmonary effort is normal. No respiratory distress. Breath sounds: No wheezing, rhonchi or rales. Abdominal:      General: Bowel sounds are normal. There is no distension. Palpations: Abdomen is soft. Tenderness: There is no abdominal tenderness. Musculoskeletal: Normal range of motion. treatment  It is always recommended that you bring your medicationsbottles with you to each visit - this is for your safety! Please do not hesitate to contact me for any questions or concerns. Sincerely yours,    DEREK Bernal    This dictation was generated by voice recognition computer software. Although all attempts are made to edit dictation for accuracy, there may be errors in the transcription that are not intended.

## 2020-09-22 NOTE — PATIENT INSTRUCTIONS
Orders Placed This Encounter   Procedures    EKG 12 lead     Order Specific Question:   Reason for Exam?     Answer:   Hypertension     Return in about 1 year (around 9/22/2021). Call with any questionsor concerns  Follow up with Crystal Mark, DO for non cardiac problems  Report any new problems  Cardiovascular Fitness-Exercise as tolerated. Strive for 15 minutes of exercise most days of the week. Cardiac / HealthyDiet  Continue current medications as directed  Continue plan of treatment  It is always recommended that you bring your medicationsbottles with you to each visit - this is for your safety! Hypertension  (High Blood Pressure)  Most people with high blood pressure (hypertension) have no symptoms. High blood pressure can be a dangerous problem. Hypertension is dangerous because you may have it and not know it. High blood pressure may mean that your heart needs to work harder to pump blood. Your blood pressure is measured with 2 numbers. The first number is when your heart flexes (contracts), and the second number is when your heart relaxes. The higher the numbers are, the more you are at risk for problems. Write down your blood pressure today. The best blood pressure for adults is 120/80 (mmHg) or lower. It is likely that your blood pressure was recorded at least 2 times today. It is important for you to give these numbers to your doctor. If you do not have a doctor, try to get follow-up care at a hospital or community clinic. You may need to start high blood pressure medicine. You may also need to adjust your medicines as told by your doctor. Even mild high blood pressure increases long-term health risks. One high reading does not mean you have hypertension. · Your blood pressure should be taken when you are relaxed. It is also important to sit for about 10 minutes before being tested.    · Things that can increase your blood pressure are:  Injury, Illness, Stress, Caffeine, and some

## 2020-09-29 ENCOUNTER — TELEPHONE (OUTPATIENT)
Dept: CARDIOLOGY | Age: 75
End: 2020-09-29

## 2020-09-29 RX ORDER — ISOSORBIDE MONONITRATE 30 MG/1
30 TABLET, EXTENDED RELEASE ORAL DAILY
Qty: 90 TABLET | Refills: 3 | Status: SHIPPED | OUTPATIENT
Start: 2020-09-29 | End: 2020-12-08 | Stop reason: SDUPTHER

## 2020-09-29 RX ORDER — ATORVASTATIN CALCIUM 40 MG/1
TABLET, FILM COATED ORAL
Qty: 90 TABLET | Refills: 3 | Status: SHIPPED | OUTPATIENT
Start: 2020-09-29 | End: 2020-12-08 | Stop reason: SDUPTHER

## 2020-09-29 RX ORDER — NIFEDIPINE 30 MG/1
30 TABLET, EXTENDED RELEASE ORAL DAILY
Qty: 90 TABLET | Refills: 1 | Status: SHIPPED | OUTPATIENT
Start: 2020-09-29 | End: 2021-03-30 | Stop reason: SDUPTHER

## 2020-09-29 RX ORDER — CLOPIDOGREL BISULFATE 75 MG/1
75 TABLET ORAL DAILY
Qty: 90 TABLET | Refills: 3 | Status: SHIPPED | OUTPATIENT
Start: 2020-09-29 | End: 2021-10-27 | Stop reason: SDUPTHER

## 2020-09-29 NOTE — TELEPHONE ENCOUNTER
Start nifedipine 30 mg daily. Please schedule her a one-month follow-up with Lashell Sun to see how she is doing on the medication and reevaluate the blood pressure.

## 2020-09-29 NOTE — TELEPHONE ENCOUNTER
Patient notified and she voiced understanding. Med pended to [employer] to sign. Appt scheduled for 10/29/20 at 0930.

## 2020-09-29 NOTE — TELEPHONE ENCOUNTER
Patient saw Leonora Comes on 20:   1. CAD-patient is on Plavix, beta-blocker, Imdur, and statin therapy.     2. Hypertension-blood pressure today 144/84. Gave patient a blood pressure cuff and she will keep a blood pressure log and call in a week with results. She has had a reaction to lisinopril in the past.  May try an ARB.     3. Hyperlipidemia-on statin therapy. Managed by PCP. Goal of LDL less than 70.     BP readings:    : 193/87 166/89  : 180/91  : 156/85 138/67  : 167/83 165/92    Takes metoprolol 25 m.5 tablet BID

## 2020-10-29 ENCOUNTER — OFFICE VISIT (OUTPATIENT)
Dept: CARDIOLOGY | Age: 75
End: 2020-10-29
Payer: MEDICARE

## 2020-10-29 VITALS
WEIGHT: 158 LBS | OXYGEN SATURATION: 97 % | HEART RATE: 89 BPM | DIASTOLIC BLOOD PRESSURE: 78 MMHG | SYSTOLIC BLOOD PRESSURE: 122 MMHG | BODY MASS INDEX: 29.08 KG/M2 | HEIGHT: 62 IN

## 2020-10-29 PROCEDURE — 99213 OFFICE O/P EST LOW 20 MIN: CPT | Performed by: NURSE PRACTITIONER

## 2020-10-29 RX ORDER — ACETAMINOPHEN 500 MG
1000 TABLET ORAL PRN
COMMUNITY

## 2020-10-29 ASSESSMENT — ENCOUNTER SYMPTOMS
TROUBLE SWALLOWING: 0
BLOOD IN STOOL: 0
ABDOMINAL DISTENTION: 0
NAUSEA: 0
COUGH: 0
WHEEZING: 0
FACIAL SWELLING: 0
EYE REDNESS: 0
ABDOMINAL PAIN: 0
EYE DISCHARGE: 0
VOMITING: 0
SHORTNESS OF BREATH: 0
COLOR CHANGE: 0

## 2020-10-29 NOTE — PATIENT INSTRUCTIONS
No orders of the defined types were placed in this encounter. Return in about 6 months (around 4/29/2021). Call with any questionsor concerns  Follow up with Hood Renae, DO for non cardiac problems  Report any new problems  Cardiovascular Fitness-Exercise as tolerated. Strive for 15 minutes of exercise most days of the week. Cardiac / HealthyDiet  Continue current medications as directed  Continue plan of treatment  It is always recommended that you bring your medicationsbottles with you to each visit - this is for your safety!

## 2020-10-29 NOTE — PROGRESS NOTES
[address] [place] [address] Cardiology  [address]  [address]  Phone: [phone]  Fax: [phone]    OFFICE VISIT:  10/29/2020    Douglas Allen - : 1945    Reason For Visit:  Craig Zamorano is a 69 y.o. female who is here for Follow-up (BP better); Coronary Artery Disease; Hypertension; and Hyperlipidemia      HPI    Ms. William Faith is a 69 y.o. female with history of coronary artery disease, hypertension, hyperlipidemia, former nicotine dependence, and a family history of CAD who presents with the chief complaint of follow-up for hypertension. Patient states since starting nifedipine blood pressures have improved. There were couple days where she was out of her medication and her blood pressure was elevated however she has been taking it again and no problems. Craig Zamorano has no exertional chest pain, pressure, burning, tightness or squeezing. No symptomatic tachy- or patel-arrhythmia. No lightheadedness, dizziness, or syncope. No numbness or weakness to suggest cerebrovascular accident or transient ischemic attack. she denies signs of bleeding. Reports no edema or shortness of breath. She denies orthopnea or paroxysmal nocturnal dyspnea. she is sleeping on 1 pillow at night. she has been compliant with her medications. she reports no activity change. PCP follows lipids and labs. Lucila Burns DO is PCP.   Bolasen Alejandro has the following history as recorded in [facility]:    Patient Active Problem List    Diagnosis Date Noted    Chronic right-sided low back pain without sciatica 2018    Sacroiliac joint dysfunction of right side 2018    Trochanteric bursitis of right hip 2018    Muscle spasm of back 2018    Overweight (BMI 25.0-29.9) 2017    Renal cyst 2017    Right hip pain 2017    CAD (coronary artery disease)     Hypertension     Hyperlipidemia      Past Medical History:   Diagnosis Date    CAD (coronary artery disease)     GERD (gastroesophageal reflux disease)     Hyperlipidemia     PCP manages cholesterol/labs    Hypertension     Substance abuse ([facility] [address]     Tobacco     Past Surgical History:   Procedure Laterality Date    CARDIAC CATHETERIZATION  12  MDL    EF  60%    CARDIAC CATHETERIZATION  12  MDL    with stenting to RCA   [address] GRAFT  12    PRAKASH to LAD, sequential SVG to OM and Diag, SVG to PDA    HYSTERECTOMY      Partial    OVARIAN CYST REMOVAL       Family History   Problem Relation Age of Onset    Heart Disease Mother     Heart Disease Father     Stroke Father     Cancer Sister      Social History     Tobacco Use    Smoking status: Former Smoker     Types: Cigarettes     Last attempt to quit: 2012     Years since quittin.2    Smokeless tobacco: Never Used   Substance Use Topics    Alcohol use: No      Current Outpatient Medications   Medication Sig Dispense Refill    acetaminophen (TYLENOL) 500 MG tablet Take 1,000 mg by mouth as needed for Pain      atorvastatin (LIPITOR) 40 MG tablet TAKE 1 TABLET BY MOUTH ONCE DAILY 90 tablet 3    clopidogrel (PLAVIX) 75 MG tablet Take 1 tablet by mouth daily 90 tablet 3    isosorbide mononitrate (IMDUR) 30 MG extended release tablet Take 1 tablet by mouth daily 90 tablet 3    metoprolol tartrate (LOPRESSOR) 25 MG tablet Take 0.5 tablets by mouth 2 times daily 90 tablet 3    NIFEdipine (PROCARDIA XL) 30 MG extended release tablet Take 1 tablet by mouth daily 90 tablet 1    nitroGLYCERIN (NITROSTAT) 0.4 MG SL tablet Place 1 tablet under the tongue every 5 minutes as needed for Chest pain 25 tablet 5    buPROPion (WELLBUTRIN SR) 150 MG extended release tablet       famotidine (PEPCID) 20 MG tablet Take 20 mg by mouth daily. No current facility-administered medications for this visit. Allergies: Asa arthritis strength-antacid [aspirin buff, al hyd-mg hyd]; Ciprofloxacin; Demerol;  Ibuprofen; Breath sounds: No wheezing, rhonchi or rales. Abdominal:      General: Bowel sounds are normal. There is no distension. Palpations: Abdomen is soft. Tenderness: There is no abdominal tenderness. Musculoskeletal: Normal range of motion. Skin:     General: Skin is warm and dry. Capillary Refill: Capillary refill takes less than 2 seconds. Findings: No rash. Neurological:      Mental Status: She is alert and oriented to person, place, and time. Psychiatric:         Behavior: Behavior normal.         Judgment: Judgment normal.         Cardiac data:    ECG 9/22/2020  Sinus rhythm with nonspecific T wave abnormalities, 77 bpm    QTc 0.416 ms    8/1/2012 Cath severe 3 vessel and left main coronary artery disease  8/2/2020 CABG x4 [place] to lad, SVG-OM, SVG-diag, SVG-PDA  8/7/2012 Cath PTCA to posterior descending branch of the RCA, proximal and mid RCA  8/22/2012 DSE negative for evidence of myocardial ischemia  4/30/2015 SE negative for evidence of myocardial ischemia  9/12/2017 SE negative for evidence of myocardial ischemia    Assessment, Recommendations, & Plan:  69 y.o. female with     Diagnosis Orders   1. Coronary artery disease involving native coronary artery of native heart without angina pectoris     2. Essential hypertension     3. Mixed hyperlipidemia         1. CAD-patient is on Plavix, beta-blocker, Imdur, CCB, and statin therapy. She has had a reaction to lisinopril in the past.    2.  Hypertension-blood pressure today 122/78. No changes to current medication regimen. 3.  Hyperlipidemia-on statin therapy. Managed by PCP. Goal of LDL less than 70. No orders of the defined types were placed in this encounter. Return in about 6 months (around 4/29/2021). Call with any questionsor concerns  Follow up with Edu Vogel DO for non cardiac problems  Report any new problems  Cardiovascular Fitness-Exercise as tolerated.   Strive for 15 minutes of exercise most days of the week. Cardiac / HealthyDiet  Continue current medications as directed  Continue plan of treatment  It is always recommended that you bring your medicationsbottles with you to each visit - this is for your safety! Please do not hesitate to contact me for any questions or concerns. Sincerely yours,    DEREK Valle    This dictation was generated by voice recognition computer software. Although all attempts are made to edit dictation for accuracy, there may be errors in the transcription that are not intended.

## 2020-12-08 RX ORDER — ISOSORBIDE MONONITRATE 30 MG/1
30 TABLET, EXTENDED RELEASE ORAL DAILY
Qty: 90 TABLET | Refills: 5 | Status: SHIPPED | OUTPATIENT
Start: 2020-12-08 | End: 2021-10-27 | Stop reason: SDUPTHER

## 2020-12-08 RX ORDER — ATORVASTATIN CALCIUM 40 MG/1
TABLET, FILM COATED ORAL
Qty: 90 TABLET | Refills: 5 | Status: SHIPPED | OUTPATIENT
Start: 2020-12-08 | End: 2022-06-01 | Stop reason: SDUPTHER

## 2021-03-30 RX ORDER — NIFEDIPINE 30 MG/1
30 TABLET, EXTENDED RELEASE ORAL DAILY
Qty: 90 TABLET | Refills: 3 | Status: SHIPPED | OUTPATIENT
Start: 2021-03-30 | End: 2021-04-30

## 2021-04-30 ENCOUNTER — OFFICE VISIT (OUTPATIENT)
Dept: CARDIOLOGY CLINIC | Age: 76
End: 2021-04-30
Payer: MEDICARE

## 2021-04-30 VITALS
OXYGEN SATURATION: 98 % | HEIGHT: 62 IN | HEART RATE: 76 BPM | SYSTOLIC BLOOD PRESSURE: 122 MMHG | BODY MASS INDEX: 28.71 KG/M2 | WEIGHT: 156 LBS | DIASTOLIC BLOOD PRESSURE: 82 MMHG

## 2021-04-30 DIAGNOSIS — I10 ESSENTIAL HYPERTENSION: ICD-10-CM

## 2021-04-30 DIAGNOSIS — I25.10 CORONARY ARTERY DISEASE INVOLVING NATIVE CORONARY ARTERY OF NATIVE HEART WITHOUT ANGINA PECTORIS: Primary | ICD-10-CM

## 2021-04-30 DIAGNOSIS — E78.2 MIXED HYPERLIPIDEMIA: ICD-10-CM

## 2021-04-30 PROCEDURE — 99213 OFFICE O/P EST LOW 20 MIN: CPT | Performed by: NURSE PRACTITIONER

## 2021-04-30 PROCEDURE — 93000 ELECTROCARDIOGRAM COMPLETE: CPT | Performed by: NURSE PRACTITIONER

## 2021-04-30 RX ORDER — HYDROCODONE BITARTRATE AND ACETAMINOPHEN 10; 325 MG/1; MG/1
1 TABLET ORAL EVERY 6 HOURS PRN
COMMUNITY

## 2021-04-30 RX ORDER — AMLODIPINE BESYLATE 5 MG/1
5 TABLET ORAL DAILY
Qty: 90 TABLET | Refills: 1 | Status: SHIPPED | OUTPATIENT
Start: 2021-04-30 | End: 2021-05-10

## 2021-04-30 ASSESSMENT — ENCOUNTER SYMPTOMS
SHORTNESS OF BREATH: 0
COLOR CHANGE: 0
ABDOMINAL PAIN: 0
VOMITING: 0
BLOOD IN STOOL: 0
EYE DISCHARGE: 0
WHEEZING: 0
FACIAL SWELLING: 0
ABDOMINAL DISTENTION: 0
NAUSEA: 0
TROUBLE SWALLOWING: 0
COUGH: 0
EYE REDNESS: 0

## 2021-04-30 NOTE — PROGRESS NOTES
[address] [place] [address] Cardiology  [address]  [address]  Phone: [phone]  Fax: [phone]    OFFICE VISIT:  2021    Claudia Bourne - : 1945    Reason For Visit:  Eli Markham is a 77 y.o. female who is here for 6 Month Follow-Up (Patient has no cardiac complaints. ), Coronary Artery Disease, Hyperlipidemia, and Hypertension      HPI    Ms. Stephen Frank is a 77 y.o. female with history of coronary artery disease, hypertension, hyperlipidemia, former nicotine dependence, and a family history of CAD who presents with the chief complaint of follow-up for 6 month follow-up. Patient states a cardiac standpoint she has been doing well. She denies any chest pain, pressure, or tightness. She states she has recently found a spot on her right hip where they are concerned about cancer. She is being followed by Dr. Ella Marshall. She would like to change blood pressure medications as nifedipine is too expensive for her to continue. Eli Markham has no exertional chest pain, pressure, burning, tightness or squeezing. No symptomatic tachy- or patel-arrhythmia. No lightheadedness, dizziness, or syncope. No numbness or weakness to suggest cerebrovascular accident or transient ischemic attack. she denies signs of bleeding. Reports no edema or shortness of breath. She denies orthopnea or paroxysmal nocturnal dyspnea. she is sleeping on 1 pillow at night. she has been compliant with her medications. she reports no activity change. PCP follows lipids and labs. Boone Vasquez DO is PCP.   Claudia Bourne has the following history as recorded in [facility]:    Patient Active Problem List    Diagnosis Date Noted    Chronic right-sided low back pain without sciatica 2018    Sacroiliac joint dysfunction of right side 2018    Trochanteric bursitis of right hip 2018    Muscle spasm of back 2018    Overweight (BMI 25.0-29.9) 2017    Renal cyst 2017    Right hip pain 2017    CAD (coronary artery disease)     Hypertension     Hyperlipidemia      Past Medical History:   Diagnosis Date    CAD (coronary artery disease)     GERD (gastroesophageal reflux disease)     Hyperlipidemia     PCP manages cholesterol/labs    Hypertension     Substance abuse ([facility] [address]     Tobacco     Past Surgical History:   Procedure Laterality Date    CARDIAC CATHETERIZATION  12  MDL    EF  60%    CARDIAC CATHETERIZATION  12  MDL    with stenting to RCA   [address] GRAFT  12    PRAKASH to LAD, sequential SVG to OM and Diag, SVG to PDA    HYSTERECTOMY  1984    Partial    OVARIAN CYST REMOVAL       Family History   Problem Relation Age of Onset    Heart Disease Mother     Heart Disease Father     Stroke Father     Cancer Sister      Social History     Tobacco Use    Smoking status: Former Smoker     Types: Cigarettes     Quit date: 2012     Years since quittin.7    Smokeless tobacco: Never Used   Substance Use Topics    Alcohol use: No      Current Outpatient Medications   Medication Sig Dispense Refill    HYDROcodone-acetaminophen (NORCO)  MG per tablet Take 1 tablet by mouth every 6 hours as needed for Pain.       amLODIPine (NORVASC) 5 MG tablet Take 1 tablet by mouth daily 90 tablet 1    atorvastatin (LIPITOR) 40 MG tablet TAKE 1 TABLET BY MOUTH ONCE DAILY 90 tablet 5    isosorbide mononitrate (IMDUR) 30 MG extended release tablet Take 1 tablet by mouth daily 90 tablet 5    metoprolol tartrate (LOPRESSOR) 25 MG tablet Take 0.5 tablets by mouth 2 times daily 90 tablet 5    acetaminophen (TYLENOL) 500 MG tablet Take 1,000 mg by mouth as needed for Pain      clopidogrel (PLAVIX) 75 MG tablet Take 1 tablet by mouth daily 90 tablet 3    nitroGLYCERIN (NITROSTAT) 0.4 MG SL tablet Place 1 tablet under the tongue every 5 minutes as needed for Chest pain 25 tablet 5    buPROPion (WELLBUTRIN SR) 150 MG extended release tracheal deviation. Cardiovascular:      Rate and Rhythm: Normal rate and regular rhythm. Heart sounds: Normal heart sounds. No murmur. No friction rub. No gallop. Pulmonary:      Effort: Pulmonary effort is normal. No respiratory distress. Breath sounds: No wheezing, rhonchi or rales. Abdominal:      General: Bowel sounds are normal. There is no distension. Palpations: Abdomen is soft. Tenderness: There is no abdominal tenderness. Musculoskeletal: Normal range of motion. Skin:     General: Skin is warm and dry. Capillary Refill: Capillary refill takes less than 2 seconds. Findings: No rash. Neurological:      Mental Status: She is alert and oriented to person, place, and time. Psychiatric:         Behavior: Behavior normal.         Judgment: Judgment normal.         Cardiac data:    ECG 9/22/2020  Sinus rhythm with nonspecific T wave abnormalities, 75 bpm    QTc 0.410 ms    8/1/2012 Cath severe 3 vessel and left main coronary artery disease  8/2/2020 CABG x4 [place] to lad, SVG-OM, SVG-diag, SVG-PDA  8/7/2012 Cath PTCA to posterior descending branch of the RCA, proximal and mid RCA  8/22/2012 DSE negative for evidence of myocardial ischemia  4/30/2015 SE negative for evidence of myocardial ischemia  9/12/2017 SE negative for evidence of myocardial ischemia    Assessment, Recommendations, & Plan:  77 y.o. female with     Diagnosis Orders   1. Coronary artery disease involving native coronary artery of native heart without angina pectoris  EKG 12 lead   2. Essential hypertension  EKG 12 lead   3. Mixed hyperlipidemia         1. CAD-patient is on Plavix, beta-blocker, Imdur, CCB, and statin therapy. She has had a reaction to lisinopril in the past.    2.  Hypertension-blood pressure today 122/82. Patient is asking to discontinue nifedipine due to cost.  We will try amlodipine. Patient will keep blood pressure log and call in 1 week. 3.  Hyperlipidemia-on statin therapy. Managed by PCP. Goal of LDL less than 70. Orders Placed This Encounter   Procedures    EKG 12 lead     Order Specific Question:   Reason for Exam?     Answer: Other     Return in about 6 months (around 10/30/2021). Call with any questionsor concerns  Follow up with Panchito Woodall DO for non cardiac problems  Report any new problems  Cardiovascular Fitness-Exercise as tolerated. Strive for 15 minutes of exercise most days of the week. Cardiac / HealthyDiet  Continue current medications as directed  Continue plan of treatment  It is always recommended that you bring your medicationsbottles with you to each visit - this is for your safety! Please do not hesitate to contact me for any questions or concerns. Sincerely yours,    Scotty Apley, APRN    This dictation was generated by voice recognition computer software. Although all attempts are made to edit dictation for accuracy, there may be errors in the transcription that are not intended.

## 2021-05-10 ENCOUNTER — TELEPHONE (OUTPATIENT)
Dept: CARDIOLOGY CLINIC | Age: 76
End: 2021-05-10

## 2021-05-10 RX ORDER — AMLODIPINE BESYLATE 5 MG/1
10 TABLET ORAL DAILY
Qty: 90 TABLET | Refills: 1
Start: 2021-05-10 | End: 2021-10-22

## 2021-05-17 ENCOUNTER — PRE-ADMISSION TESTING (OUTPATIENT)
Dept: PREADMISSION TESTING | Facility: HOSPITAL | Age: 76
End: 2021-05-17

## 2021-05-17 ENCOUNTER — OFFICE VISIT (OUTPATIENT)
Dept: NEUROSURGERY | Facility: CLINIC | Age: 76
End: 2021-05-17

## 2021-05-17 ENCOUNTER — PREP FOR SURGERY (OUTPATIENT)
Dept: OTHER | Facility: HOSPITAL | Age: 76
End: 2021-05-17

## 2021-05-17 VITALS
OXYGEN SATURATION: 95 % | SYSTOLIC BLOOD PRESSURE: 121 MMHG | BODY MASS INDEX: 28.53 KG/M2 | HEART RATE: 67 BPM | HEIGHT: 62 IN | RESPIRATION RATE: 20 BRPM | DIASTOLIC BLOOD PRESSURE: 81 MMHG | WEIGHT: 155.05 LBS

## 2021-05-17 VITALS — HEIGHT: 62 IN | WEIGHT: 155 LBS | BODY MASS INDEX: 28.52 KG/M2

## 2021-05-17 DIAGNOSIS — C79.51 SECONDARY MALIGNANT NEOPLASM OF BONE AND BONE MARROW (HCC): Primary | ICD-10-CM

## 2021-05-17 DIAGNOSIS — S32.020A COMPRESSION FRACTURE OF L2 LUMBAR VERTEBRA, CLOSED, INITIAL ENCOUNTER (HCC): ICD-10-CM

## 2021-05-17 DIAGNOSIS — C79.52 SECONDARY MALIGNANT NEOPLASM OF BONE AND BONE MARROW (HCC): Primary | ICD-10-CM

## 2021-05-17 DIAGNOSIS — Z78.9 NON-SMOKER: ICD-10-CM

## 2021-05-17 DIAGNOSIS — E66.3 OVERWEIGHT WITH BODY MASS INDEX (BMI) OF 28 TO 28.9 IN ADULT: ICD-10-CM

## 2021-05-17 DIAGNOSIS — C79.51 SECONDARY MALIGNANT NEOPLASM OF BONE AND BONE MARROW (HCC): ICD-10-CM

## 2021-05-17 DIAGNOSIS — C79.52 SECONDARY MALIGNANT NEOPLASM OF BONE AND BONE MARROW (HCC): ICD-10-CM

## 2021-05-17 LAB
ALBUMIN SERPL-MCNC: 4.3 G/DL (ref 3.5–5.2)
ALBUMIN/GLOB SERPL: 1.7 G/DL
ALP SERPL-CCNC: 163 U/L (ref 39–117)
ALT SERPL W P-5'-P-CCNC: 16 U/L (ref 1–33)
ANION GAP SERPL CALCULATED.3IONS-SCNC: 10 MMOL/L (ref 5–15)
AST SERPL-CCNC: 16 U/L (ref 1–32)
BACTERIA UR QL AUTO: ABNORMAL /HPF
BASOPHILS # BLD AUTO: 0.06 10*3/MM3 (ref 0–0.2)
BASOPHILS NFR BLD AUTO: 0.7 % (ref 0–1.5)
BILIRUB SERPL-MCNC: 0.4 MG/DL (ref 0–1.2)
BILIRUB UR QL STRIP: NEGATIVE
BUN SERPL-MCNC: 15 MG/DL (ref 8–23)
BUN/CREAT SERPL: 27.8 (ref 7–25)
CALCIUM SPEC-SCNC: 9.5 MG/DL (ref 8.6–10.5)
CHLORIDE SERPL-SCNC: 107 MMOL/L (ref 98–107)
CLARITY UR: CLEAR
CO2 SERPL-SCNC: 26 MMOL/L (ref 22–29)
COLOR UR: YELLOW
CREAT SERPL-MCNC: 0.54 MG/DL (ref 0.57–1)
DEPRECATED RDW RBC AUTO: 40.6 FL (ref 37–54)
EOSINOPHIL # BLD AUTO: 0.14 10*3/MM3 (ref 0–0.4)
EOSINOPHIL NFR BLD AUTO: 1.7 % (ref 0.3–6.2)
ERYTHROCYTE [DISTWIDTH] IN BLOOD BY AUTOMATED COUNT: 16.1 % (ref 12.3–15.4)
GFR SERPL CREATININE-BSD FRML MDRD: 110 ML/MIN/1.73
GFR SERPL CREATININE-BSD FRML MDRD: 134 ML/MIN/1.73
GLOBULIN UR ELPH-MCNC: 2.6 GM/DL
GLUCOSE SERPL-MCNC: 95 MG/DL (ref 65–99)
GLUCOSE UR STRIP-MCNC: NEGATIVE MG/DL
HCT VFR BLD AUTO: 39.5 % (ref 34–46.6)
HGB BLD-MCNC: 12.2 G/DL (ref 12–15.9)
HGB UR QL STRIP.AUTO: ABNORMAL
HYALINE CASTS UR QL AUTO: ABNORMAL /LPF
KETONES UR QL STRIP: NEGATIVE
LEUKOCYTE ESTERASE UR QL STRIP.AUTO: NEGATIVE
LYMPHOCYTES # BLD AUTO: 2.46 10*3/MM3 (ref 0.7–3.1)
LYMPHOCYTES NFR BLD AUTO: 29 % (ref 19.6–45.3)
MCH RBC QN AUTO: 22.1 PG (ref 26.6–33)
MCHC RBC AUTO-ENTMCNC: 30.9 G/DL (ref 31.5–35.7)
MCV RBC AUTO: 71.4 FL (ref 79–97)
MONOCYTES # BLD AUTO: 0.55 10*3/MM3 (ref 0.1–0.9)
MONOCYTES NFR BLD AUTO: 6.5 % (ref 5–12)
NEUTROPHILS NFR BLD AUTO: 5.24 10*3/MM3 (ref 1.7–7)
NEUTROPHILS NFR BLD AUTO: 61.7 % (ref 42.7–76)
NITRITE UR QL STRIP: NEGATIVE
PH UR STRIP.AUTO: <=5 [PH] (ref 5–8)
PLATELET # BLD AUTO: 228 10*3/MM3 (ref 140–450)
PMV BLD AUTO: 9.5 FL (ref 6–12)
POTASSIUM SERPL-SCNC: 3.7 MMOL/L (ref 3.5–5.2)
PROT SERPL-MCNC: 6.9 G/DL (ref 6–8.5)
PROT UR QL STRIP: ABNORMAL
RBC # BLD AUTO: 5.53 10*6/MM3 (ref 3.77–5.28)
RBC # UR: ABNORMAL /HPF
REF LAB TEST METHOD: ABNORMAL
SODIUM SERPL-SCNC: 143 MMOL/L (ref 136–145)
SP GR UR STRIP: 1.02 (ref 1–1.03)
SQUAMOUS #/AREA URNS HPF: ABNORMAL /HPF
UROBILINOGEN UR QL STRIP: ABNORMAL
WBC # BLD AUTO: 8.48 10*3/MM3 (ref 3.4–10.8)
WBC UR QL AUTO: ABNORMAL /HPF

## 2021-05-17 PROCEDURE — 85025 COMPLETE CBC W/AUTO DIFF WBC: CPT

## 2021-05-17 PROCEDURE — 93005 ELECTROCARDIOGRAM TRACING: CPT

## 2021-05-17 PROCEDURE — 81001 URINALYSIS AUTO W/SCOPE: CPT

## 2021-05-17 PROCEDURE — 80053 COMPREHEN METABOLIC PANEL: CPT

## 2021-05-17 PROCEDURE — 93010 ELECTROCARDIOGRAM REPORT: CPT | Performed by: INTERNAL MEDICINE

## 2021-05-17 PROCEDURE — 36415 COLL VENOUS BLD VENIPUNCTURE: CPT

## 2021-05-17 PROCEDURE — 99203 OFFICE O/P NEW LOW 30 MIN: CPT | Performed by: NEUROLOGICAL SURGERY

## 2021-05-17 RX ORDER — ACETAMINOPHEN 500 MG
1000 TABLET ORAL
COMMUNITY

## 2021-05-17 RX ORDER — HYDROCODONE BITARTRATE AND ACETAMINOPHEN 5; 325 MG/1; MG/1
TABLET ORAL
Status: ON HOLD | COMMUNITY
End: 2021-05-25 | Stop reason: SDUPTHER

## 2021-05-17 RX ORDER — PROMETHAZINE HYDROCHLORIDE 25 MG/1
TABLET ORAL
COMMUNITY
End: 2021-05-17

## 2021-05-17 RX ORDER — LORAZEPAM 0.5 MG/1
TABLET ORAL
COMMUNITY
End: 2021-05-17

## 2021-05-17 RX ORDER — AMLODIPINE BESYLATE 5 MG/1
TABLET ORAL
COMMUNITY
Start: 2021-05-10

## 2021-05-17 RX ORDER — BUPIVACAINE HCL/0.9 % NACL/PF 0.1 %
2 PLASTIC BAG, INJECTION (ML) EPIDURAL ONCE
Status: CANCELLED | OUTPATIENT
Start: 2021-05-17 | End: 2021-05-17

## 2021-05-17 RX ORDER — FLUTICASONE PROPIONATE 50 MCG
SPRAY, SUSPENSION (ML) NASAL
COMMUNITY
End: 2021-05-17

## 2021-05-18 ENCOUNTER — TRANSCRIBE ORDERS (OUTPATIENT)
Dept: ADMINISTRATIVE | Facility: HOSPITAL | Age: 76
End: 2021-05-18

## 2021-05-18 DIAGNOSIS — Z11.59 SCREENING FOR VIRAL DISEASE: Primary | ICD-10-CM

## 2021-05-18 LAB
QT INTERVAL: 370 MS
QTC INTERVAL: 437 MS

## 2021-05-22 ENCOUNTER — LAB (OUTPATIENT)
Dept: LAB | Facility: HOSPITAL | Age: 76
End: 2021-05-22

## 2021-05-22 LAB — SARS-COV-2 ORF1AB RESP QL NAA+PROBE: NOT DETECTED

## 2021-05-22 PROCEDURE — U0004 COV-19 TEST NON-CDC HGH THRU: HCPCS | Performed by: NEUROLOGICAL SURGERY

## 2021-05-22 PROCEDURE — C9803 HOPD COVID-19 SPEC COLLECT: HCPCS | Performed by: NEUROLOGICAL SURGERY

## 2021-05-25 ENCOUNTER — ANESTHESIA (OUTPATIENT)
Dept: PERIOP | Facility: HOSPITAL | Age: 76
End: 2021-05-25

## 2021-05-25 ENCOUNTER — HOSPITAL ENCOUNTER (OUTPATIENT)
Facility: HOSPITAL | Age: 76
Setting detail: HOSPITAL OUTPATIENT SURGERY
Discharge: HOME | End: 2021-05-25
Attending: NEUROLOGICAL SURGERY | Admitting: NEUROLOGICAL SURGERY

## 2021-05-25 ENCOUNTER — APPOINTMENT (OUTPATIENT)
Dept: GENERAL RADIOLOGY | Facility: HOSPITAL | Age: 76
End: 2021-05-25

## 2021-05-25 ENCOUNTER — ANESTHESIA EVENT (OUTPATIENT)
Dept: PERIOP | Facility: HOSPITAL | Age: 76
End: 2021-05-25

## 2021-05-25 VITALS
OXYGEN SATURATION: 99 % | RESPIRATION RATE: 18 BRPM | TEMPERATURE: 97.4 F | DIASTOLIC BLOOD PRESSURE: 90 MMHG | SYSTOLIC BLOOD PRESSURE: 154 MMHG | HEART RATE: 67 BPM

## 2021-05-25 DIAGNOSIS — C79.52 SECONDARY MALIGNANT NEOPLASM OF BONE AND BONE MARROW (HCC): ICD-10-CM

## 2021-05-25 DIAGNOSIS — S32.020A COMPRESSION FRACTURE OF L2 LUMBAR VERTEBRA, CLOSED, INITIAL ENCOUNTER (HCC): Primary | ICD-10-CM

## 2021-05-25 DIAGNOSIS — C79.51 SECONDARY MALIGNANT NEOPLASM OF BONE AND BONE MARROW (HCC): ICD-10-CM

## 2021-05-25 LAB
ABO GROUP BLD: NORMAL
BLD GP AB SCN SERPL QL: NEGATIVE
RH BLD: POSITIVE
T&S EXPIRATION DATE: NORMAL

## 2021-05-25 PROCEDURE — 88342 IMHCHEM/IMCYTCHM 1ST ANTB: CPT | Performed by: NEUROLOGICAL SURGERY

## 2021-05-25 PROCEDURE — 86850 RBC ANTIBODY SCREEN: CPT | Performed by: NEUROLOGICAL SURGERY

## 2021-05-25 PROCEDURE — 25010000002 CEFAZOLIN PER 500 MG: Performed by: NEUROLOGICAL SURGERY

## 2021-05-25 PROCEDURE — 88311 DECALCIFY TISSUE: CPT | Performed by: NEUROLOGICAL SURGERY

## 2021-05-25 PROCEDURE — 88307 TISSUE EXAM BY PATHOLOGIST: CPT | Performed by: NEUROLOGICAL SURGERY

## 2021-05-25 PROCEDURE — 25010000002 DEXAMETHASONE PER 1 MG: Performed by: NURSE ANESTHETIST, CERTIFIED REGISTERED

## 2021-05-25 PROCEDURE — 25010000002 PROPOFOL 10 MG/ML EMULSION: Performed by: NURSE ANESTHETIST, CERTIFIED REGISTERED

## 2021-05-25 PROCEDURE — 76000 FLUOROSCOPY <1 HR PHYS/QHP: CPT

## 2021-05-25 PROCEDURE — 25010000002 ONDANSETRON PER 1 MG: Performed by: NURSE ANESTHETIST, CERTIFIED REGISTERED

## 2021-05-25 PROCEDURE — 25010000002 MIDAZOLAM PER 1 MG: Performed by: ANESTHESIOLOGY

## 2021-05-25 PROCEDURE — 88341 IMHCHEM/IMCYTCHM EA ADD ANTB: CPT | Performed by: NEUROLOGICAL SURGERY

## 2021-05-25 PROCEDURE — 86900 BLOOD TYPING SEROLOGIC ABO: CPT | Performed by: NEUROLOGICAL SURGERY

## 2021-05-25 PROCEDURE — 72100 X-RAY EXAM L-S SPINE 2/3 VWS: CPT

## 2021-05-25 PROCEDURE — 20982 ABLATE BONE TUMOR(S) PERQ: CPT | Performed by: NEUROLOGICAL SURGERY

## 2021-05-25 PROCEDURE — C1713 ANCHOR/SCREW BN/BN,TIS/BN: HCPCS | Performed by: NEUROLOGICAL SURGERY

## 2021-05-25 PROCEDURE — 25010000002 IOPAMIDOL 61 % SOLUTION: Performed by: NEUROLOGICAL SURGERY

## 2021-05-25 PROCEDURE — 86901 BLOOD TYPING SEROLOGIC RH(D): CPT | Performed by: NEUROLOGICAL SURGERY

## 2021-05-25 PROCEDURE — 25010000002 FENTANYL CITRATE (PF) 100 MCG/2ML SOLUTION: Performed by: NURSE ANESTHETIST, CERTIFIED REGISTERED

## 2021-05-25 PROCEDURE — 22514 PERQ VERTEBRAL AUGMENTATION: CPT | Performed by: NEUROLOGICAL SURGERY

## 2021-05-25 PROCEDURE — 25010000002 ONDANSETRON PER 1 MG: Performed by: ANESTHESIOLOGY

## 2021-05-25 DEVICE — BONE CEMENT C01B HV-R WITH MIXER  US
Type: IMPLANTABLE DEVICE | Site: SPINE LUMBAR | Status: FUNCTIONAL
Brand: KYPHON® HV-R® BONE CEMENT AND KYPHON® MIXER PACK

## 2021-05-25 RX ORDER — NALOXONE HCL 0.4 MG/ML
0.4 VIAL (ML) INJECTION AS NEEDED
Status: DISCONTINUED | OUTPATIENT
Start: 2021-05-25 | End: 2021-05-25 | Stop reason: HOSPADM

## 2021-05-25 RX ORDER — SODIUM CHLORIDE 0.9 % (FLUSH) 0.9 %
3-10 SYRINGE (ML) INJECTION AS NEEDED
Status: DISCONTINUED | OUTPATIENT
Start: 2021-05-25 | End: 2021-05-25 | Stop reason: HOSPADM

## 2021-05-25 RX ORDER — NEOSTIGMINE METHYLSULFATE 5 MG/5 ML
SYRINGE (ML) INTRAVENOUS AS NEEDED
Status: DISCONTINUED | OUTPATIENT
Start: 2021-05-25 | End: 2021-05-25 | Stop reason: SURG

## 2021-05-25 RX ORDER — OXYCODONE AND ACETAMINOPHEN 10; 325 MG/1; MG/1
1 TABLET ORAL ONCE AS NEEDED
Status: DISCONTINUED | OUTPATIENT
Start: 2021-05-25 | End: 2021-05-25 | Stop reason: HOSPADM

## 2021-05-25 RX ORDER — SODIUM CHLORIDE 0.9 % (FLUSH) 0.9 %
3 SYRINGE (ML) INJECTION AS NEEDED
Status: DISCONTINUED | OUTPATIENT
Start: 2021-05-25 | End: 2021-05-25 | Stop reason: HOSPADM

## 2021-05-25 RX ORDER — LIDOCAINE HYDROCHLORIDE 10 MG/ML
0.5 INJECTION, SOLUTION EPIDURAL; INFILTRATION; INTRACAUDAL; PERINEURAL ONCE AS NEEDED
Status: DISCONTINUED | OUTPATIENT
Start: 2021-05-25 | End: 2021-05-25 | Stop reason: HOSPADM

## 2021-05-25 RX ORDER — FENTANYL CITRATE 50 UG/ML
25 INJECTION, SOLUTION INTRAMUSCULAR; INTRAVENOUS
Status: DISCONTINUED | OUTPATIENT
Start: 2021-05-25 | End: 2021-05-25 | Stop reason: HOSPADM

## 2021-05-25 RX ORDER — DEXAMETHASONE SODIUM PHOSPHATE 4 MG/ML
INJECTION, SOLUTION INTRA-ARTICULAR; INTRALESIONAL; INTRAMUSCULAR; INTRAVENOUS; SOFT TISSUE AS NEEDED
Status: DISCONTINUED | OUTPATIENT
Start: 2021-05-25 | End: 2021-05-25 | Stop reason: SURG

## 2021-05-25 RX ORDER — FLUMAZENIL 0.1 MG/ML
0.2 INJECTION INTRAVENOUS AS NEEDED
Status: DISCONTINUED | OUTPATIENT
Start: 2021-05-25 | End: 2021-05-25 | Stop reason: HOSPADM

## 2021-05-25 RX ORDER — MAGNESIUM HYDROXIDE 1200 MG/15ML
LIQUID ORAL AS NEEDED
Status: DISCONTINUED | OUTPATIENT
Start: 2021-05-25 | End: 2021-05-25 | Stop reason: HOSPADM

## 2021-05-25 RX ORDER — SODIUM CHLORIDE, SODIUM LACTATE, POTASSIUM CHLORIDE, CALCIUM CHLORIDE 600; 310; 30; 20 MG/100ML; MG/100ML; MG/100ML; MG/100ML
1000 INJECTION, SOLUTION INTRAVENOUS CONTINUOUS
Status: DISCONTINUED | OUTPATIENT
Start: 2021-05-25 | End: 2021-05-25 | Stop reason: HOSPADM

## 2021-05-25 RX ORDER — LIDOCAINE HYDROCHLORIDE 20 MG/ML
INJECTION, SOLUTION EPIDURAL; INFILTRATION; INTRACAUDAL; PERINEURAL AS NEEDED
Status: DISCONTINUED | OUTPATIENT
Start: 2021-05-25 | End: 2021-05-25 | Stop reason: SURG

## 2021-05-25 RX ORDER — PROPOFOL 10 MG/ML
VIAL (ML) INTRAVENOUS AS NEEDED
Status: DISCONTINUED | OUTPATIENT
Start: 2021-05-25 | End: 2021-05-25 | Stop reason: SURG

## 2021-05-25 RX ORDER — LABETALOL HYDROCHLORIDE 5 MG/ML
5 INJECTION, SOLUTION INTRAVENOUS
Status: DISCONTINUED | OUTPATIENT
Start: 2021-05-25 | End: 2021-05-25 | Stop reason: HOSPADM

## 2021-05-25 RX ORDER — ROCURONIUM BROMIDE 10 MG/ML
INJECTION, SOLUTION INTRAVENOUS AS NEEDED
Status: DISCONTINUED | OUTPATIENT
Start: 2021-05-25 | End: 2021-05-25 | Stop reason: SURG

## 2021-05-25 RX ORDER — BUPIVACAINE HYDROCHLORIDE AND EPINEPHRINE 2.5; 5 MG/ML; UG/ML
INJECTION, SOLUTION INFILTRATION; PERINEURAL AS NEEDED
Status: DISCONTINUED | OUTPATIENT
Start: 2021-05-25 | End: 2021-05-25 | Stop reason: HOSPADM

## 2021-05-25 RX ORDER — ONDANSETRON 2 MG/ML
4 INJECTION INTRAMUSCULAR; INTRAVENOUS ONCE AS NEEDED
Status: COMPLETED | OUTPATIENT
Start: 2021-05-25 | End: 2021-05-25

## 2021-05-25 RX ORDER — HYDROCODONE BITARTRATE AND ACETAMINOPHEN 5; 325 MG/1; MG/1
1 TABLET ORAL EVERY 6 HOURS PRN
Qty: 60 TABLET | Refills: 0 | Status: SHIPPED | OUTPATIENT
Start: 2021-05-25

## 2021-05-25 RX ORDER — MIDAZOLAM HYDROCHLORIDE 1 MG/ML
0.5 INJECTION INTRAMUSCULAR; INTRAVENOUS
Status: DISCONTINUED | OUTPATIENT
Start: 2021-05-25 | End: 2021-05-25 | Stop reason: HOSPADM

## 2021-05-25 RX ORDER — ONDANSETRON 2 MG/ML
INJECTION INTRAMUSCULAR; INTRAVENOUS AS NEEDED
Status: DISCONTINUED | OUTPATIENT
Start: 2021-05-25 | End: 2021-05-25 | Stop reason: SURG

## 2021-05-25 RX ORDER — BUPIVACAINE HCL/0.9 % NACL/PF 0.1 %
2 PLASTIC BAG, INJECTION (ML) EPIDURAL ONCE
Status: COMPLETED | OUTPATIENT
Start: 2021-05-25 | End: 2021-05-25

## 2021-05-25 RX ORDER — OXYCODONE AND ACETAMINOPHEN 7.5; 325 MG/1; MG/1
2 TABLET ORAL EVERY 4 HOURS PRN
Status: DISCONTINUED | OUTPATIENT
Start: 2021-05-25 | End: 2021-05-25 | Stop reason: HOSPADM

## 2021-05-25 RX ORDER — SODIUM CHLORIDE 0.9 % (FLUSH) 0.9 %
3 SYRINGE (ML) INJECTION EVERY 12 HOURS SCHEDULED
Status: DISCONTINUED | OUTPATIENT
Start: 2021-05-25 | End: 2021-05-25 | Stop reason: HOSPADM

## 2021-05-25 RX ORDER — FENTANYL CITRATE 50 UG/ML
INJECTION, SOLUTION INTRAMUSCULAR; INTRAVENOUS AS NEEDED
Status: DISCONTINUED | OUTPATIENT
Start: 2021-05-25 | End: 2021-05-25 | Stop reason: SURG

## 2021-05-25 RX ORDER — SODIUM CHLORIDE, SODIUM LACTATE, POTASSIUM CHLORIDE, CALCIUM CHLORIDE 600; 310; 30; 20 MG/100ML; MG/100ML; MG/100ML; MG/100ML
100 INJECTION, SOLUTION INTRAVENOUS CONTINUOUS
Status: DISCONTINUED | OUTPATIENT
Start: 2021-05-25 | End: 2021-05-25 | Stop reason: HOSPADM

## 2021-05-25 RX ADMIN — GLYCOPYRROLATE 0.4 MG: 0.2 INJECTION, SOLUTION INTRAMUSCULAR; INTRAVENOUS at 17:48

## 2021-05-25 RX ADMIN — OXYCODONE HYDROCHLORIDE AND ACETAMINOPHEN 2 TABLET: 7.5; 325 TABLET ORAL at 18:20

## 2021-05-25 RX ADMIN — DEXAMETHASONE SODIUM PHOSPHATE 4 MG: 4 INJECTION, SOLUTION INTRA-ARTICULAR; INTRALESIONAL; INTRAMUSCULAR; INTRAVENOUS; SOFT TISSUE at 17:29

## 2021-05-25 RX ADMIN — VASOPRESSIN 0.5 ML: 20 INJECTION INTRAVENOUS at 17:38

## 2021-05-25 RX ADMIN — Medication 3 MG: at 17:48

## 2021-05-25 RX ADMIN — ROCURONIUM BROMIDE 30 MG: 10 INJECTION INTRAVENOUS at 17:00

## 2021-05-25 RX ADMIN — ONDANSETRON HYDROCHLORIDE 4 MG: 2 SOLUTION INTRAMUSCULAR; INTRAVENOUS at 18:21

## 2021-05-25 RX ADMIN — Medication 2 G: at 17:01

## 2021-05-25 RX ADMIN — ONDANSETRON 4 MG: 2 INJECTION INTRAMUSCULAR; INTRAVENOUS at 17:29

## 2021-05-25 RX ADMIN — LIDOCAINE HYDROCHLORIDE 100 MG: 20 INJECTION, SOLUTION EPIDURAL; INFILTRATION; INTRACAUDAL; PERINEURAL at 16:59

## 2021-05-25 RX ADMIN — SODIUM CHLORIDE, POTASSIUM CHLORIDE, SODIUM LACTATE AND CALCIUM CHLORIDE: 600; 310; 30; 20 INJECTION, SOLUTION INTRAVENOUS at 17:57

## 2021-05-25 RX ADMIN — LIDOCAINE HYDROCHLORIDE 80 MG: 20 INJECTION, SOLUTION EPIDURAL; INFILTRATION; INTRACAUDAL; PERINEURAL at 17:52

## 2021-05-25 RX ADMIN — PROPOFOL 150 MG: 10 INJECTION, EMULSION INTRAVENOUS at 17:00

## 2021-05-25 RX ADMIN — MIDAZOLAM 0.5 MG: 1 INJECTION INTRAMUSCULAR; INTRAVENOUS at 16:39

## 2021-05-25 RX ADMIN — SODIUM CHLORIDE, POTASSIUM CHLORIDE, SODIUM LACTATE AND CALCIUM CHLORIDE 1000 ML: 600; 310; 30; 20 INJECTION, SOLUTION INTRAVENOUS at 15:15

## 2021-05-25 RX ADMIN — FENTANYL CITRATE 100 MCG: 50 INJECTION, SOLUTION INTRAMUSCULAR; INTRAVENOUS at 17:00

## 2021-05-27 ENCOUNTER — TELEPHONE (OUTPATIENT)
Dept: NEUROSURGERY | Facility: CLINIC | Age: 76
End: 2021-05-27

## 2021-06-08 ENCOUNTER — TELEPHONE (OUTPATIENT)
Dept: NEUROSURGERY | Facility: CLINIC | Age: 76
End: 2021-06-08

## 2021-06-10 ENCOUNTER — TRANSCRIBE ORDERS (OUTPATIENT)
Dept: ADMINISTRATIVE | Facility: HOSPITAL | Age: 76
End: 2021-06-10

## 2021-06-10 DIAGNOSIS — D48.0: Primary | ICD-10-CM

## 2021-06-15 ENCOUNTER — HOSPITAL ENCOUNTER (OUTPATIENT)
Dept: CT IMAGING | Facility: HOSPITAL | Age: 76
Discharge: HOME | End: 2021-06-15

## 2021-06-17 ENCOUNTER — TELEPHONE (OUTPATIENT)
Dept: CARDIOLOGY CLINIC | Age: 76
End: 2021-06-17

## 2021-06-17 NOTE — TELEPHONE ENCOUNTER
Date: TBD    Cardiologist: Dr. Kenneth Urias    Procedure: bone marrow biopsy    Surgeon: Dr. Onel Reid Visit: 4-30-21    Reason for office visit and medical concerns addressed at this office visit: CAD, HTN, hyperlipidemia    Testing Performed and Date of Service:  ekg 4-30-21    Does the patient have a stent? If so, what type? Not in last year     Current Medications: Plavix, norvasc, norco, lipitor, imdur, lopressor, tylenol, nitro, wellbutrin, pepcid    Is the patient currently taking an anticoagulant?  If so, what is the diagnosis the patient has been given to warrant the need for the anticoagulant? plavix     Additional Notes: med hold on plavix x 5 days and cardiac risk request    1 pt, low, 0.9%  Mets---3

## 2021-06-18 ENCOUNTER — TRANSCRIBE ORDERS (OUTPATIENT)
Dept: LAB | Facility: HOSPITAL | Age: 76
End: 2021-06-18

## 2021-06-18 DIAGNOSIS — Z01.818 PREOP TESTING: Primary | ICD-10-CM

## 2021-06-21 ENCOUNTER — LAB (OUTPATIENT)
Dept: LAB | Facility: HOSPITAL | Age: 76
End: 2021-06-21

## 2021-06-21 LAB — SARS-COV-2 ORF1AB RESP QL NAA+PROBE: NOT DETECTED

## 2021-06-21 PROCEDURE — U0004 COV-19 TEST NON-CDC HGH THRU: HCPCS | Performed by: INTERNAL MEDICINE

## 2021-06-21 PROCEDURE — C9803 HOPD COVID-19 SPEC COLLECT: HCPCS | Performed by: INTERNAL MEDICINE

## 2021-06-23 ENCOUNTER — HOSPITAL ENCOUNTER (OUTPATIENT)
Dept: CT IMAGING | Facility: HOSPITAL | Age: 76
Discharge: HOME | End: 2021-06-23
Admitting: INTERNAL MEDICINE

## 2021-06-23 VITALS
BODY MASS INDEX: 28.23 KG/M2 | RESPIRATION RATE: 19 BRPM | HEIGHT: 62 IN | DIASTOLIC BLOOD PRESSURE: 66 MMHG | OXYGEN SATURATION: 100 % | TEMPERATURE: 97.8 F | SYSTOLIC BLOOD PRESSURE: 126 MMHG | HEART RATE: 95 BPM | WEIGHT: 153.4 LBS

## 2021-06-23 LAB
APTT PPP: 28.2 SECONDS (ref 24.1–35)
BASOPHILS # BLD AUTO: 0.04 10*3/MM3 (ref 0–0.2)
BASOPHILS NFR BLD AUTO: 0.7 % (ref 0–1.5)
DEPRECATED RDW RBC AUTO: 41.5 FL (ref 37–54)
EOSINOPHIL # BLD AUTO: 0.17 10*3/MM3 (ref 0–0.4)
EOSINOPHIL NFR BLD AUTO: 3.1 % (ref 0.3–6.2)
ERYTHROCYTE [DISTWIDTH] IN BLOOD BY AUTOMATED COUNT: 16 % (ref 12.3–15.4)
HCT VFR BLD AUTO: 37.1 % (ref 34–46.6)
HGB BLD-MCNC: 11.3 G/DL (ref 12–15.9)
INR PPP: 1.01 (ref 0.91–1.09)
LYMPHOCYTES # BLD AUTO: 1.45 10*3/MM3 (ref 0.7–3.1)
LYMPHOCYTES NFR BLD AUTO: 26.9 % (ref 19.6–45.3)
MCH RBC QN AUTO: 22.5 PG (ref 26.6–33)
MCHC RBC AUTO-ENTMCNC: 30.5 G/DL (ref 31.5–35.7)
MCV RBC AUTO: 73.9 FL (ref 79–97)
MONOCYTES # BLD AUTO: 0.31 10*3/MM3 (ref 0.1–0.9)
MONOCYTES NFR BLD AUTO: 5.7 % (ref 5–12)
NEUTROPHILS NFR BLD AUTO: 3.41 10*3/MM3 (ref 1.7–7)
NEUTROPHILS NFR BLD AUTO: 63.2 % (ref 42.7–76)
PLATELET # BLD AUTO: 250 10*3/MM3 (ref 140–450)
PMV BLD AUTO: 9 FL (ref 6–12)
PROTHROMBIN TIME: 12.5 SECONDS (ref 11.5–13.4)
RBC # BLD AUTO: 5.02 10*6/MM3 (ref 3.77–5.28)
WBC # BLD AUTO: 5.4 10*3/MM3 (ref 3.4–10.8)

## 2021-06-23 PROCEDURE — 25010000002 FENTANYL CITRATE (PF) 50 MCG/ML SOLUTION: Performed by: RADIOLOGY

## 2021-06-23 PROCEDURE — 77012 CT SCAN FOR NEEDLE BIOPSY: CPT

## 2021-06-23 PROCEDURE — 88264 CHROMOSOME ANALYSIS 20-25: CPT | Performed by: INTERNAL MEDICINE

## 2021-06-23 PROCEDURE — 25010000003 LIDOCAINE 1 % SOLUTION: Performed by: RADIOLOGY

## 2021-06-23 PROCEDURE — 25010000002 MIDAZOLAM PER 1 MG: Performed by: RADIOLOGY

## 2021-06-23 PROCEDURE — 85025 COMPLETE CBC W/AUTO DIFF WBC: CPT | Performed by: RADIOLOGY

## 2021-06-23 PROCEDURE — 88313 SPECIAL STAINS GROUP 2: CPT | Performed by: INTERNAL MEDICINE

## 2021-06-23 PROCEDURE — 88311 DECALCIFY TISSUE: CPT | Performed by: INTERNAL MEDICINE

## 2021-06-23 PROCEDURE — 88237 TISSUE CULTURE BONE MARROW: CPT | Performed by: INTERNAL MEDICINE

## 2021-06-23 PROCEDURE — 85610 PROTHROMBIN TIME: CPT | Performed by: RADIOLOGY

## 2021-06-23 PROCEDURE — 88184 FLOWCYTOMETRY/ TC 1 MARKER: CPT | Performed by: INTERNAL MEDICINE

## 2021-06-23 PROCEDURE — 88185 FLOWCYTOMETRY/TC ADD-ON: CPT | Performed by: INTERNAL MEDICINE

## 2021-06-23 PROCEDURE — 88305 TISSUE EXAM BY PATHOLOGIST: CPT | Performed by: INTERNAL MEDICINE

## 2021-06-23 PROCEDURE — 85730 THROMBOPLASTIN TIME PARTIAL: CPT | Performed by: RADIOLOGY

## 2021-06-23 RX ORDER — LIDOCAINE HYDROCHLORIDE 10 MG/ML
INJECTION, SOLUTION INFILTRATION; PERINEURAL
Status: COMPLETED | OUTPATIENT
Start: 2021-06-23 | End: 2021-06-23

## 2021-06-23 RX ORDER — FENTANYL CITRATE 50 UG/ML
INJECTION, SOLUTION INTRAMUSCULAR; INTRAVENOUS
Status: COMPLETED | OUTPATIENT
Start: 2021-06-23 | End: 2021-06-23

## 2021-06-23 RX ORDER — MIDAZOLAM HYDROCHLORIDE 1 MG/ML
INJECTION INTRAMUSCULAR; INTRAVENOUS
Status: COMPLETED | OUTPATIENT
Start: 2021-06-23 | End: 2021-06-23

## 2021-06-23 RX ORDER — SODIUM CHLORIDE 0.9 % (FLUSH) 0.9 %
10 SYRINGE (ML) INJECTION AS NEEDED
Status: DISCONTINUED | OUTPATIENT
Start: 2021-06-23 | End: 2021-06-24 | Stop reason: HOSPADM

## 2021-06-23 RX ORDER — SODIUM CHLORIDE 0.9 % (FLUSH) 0.9 %
3 SYRINGE (ML) INJECTION EVERY 12 HOURS SCHEDULED
Status: DISCONTINUED | OUTPATIENT
Start: 2021-06-23 | End: 2021-06-24 | Stop reason: HOSPADM

## 2021-06-23 RX ADMIN — FENTANYL CITRATE 50 MCG: 50 INJECTION, SOLUTION INTRAMUSCULAR; INTRAVENOUS at 13:44

## 2021-06-23 RX ADMIN — MIDAZOLAM 1 MG: 1 INJECTION INTRAMUSCULAR; INTRAVENOUS at 13:45

## 2021-06-23 RX ADMIN — LIDOCAINE HYDROCHLORIDE 10 ML: 10 INJECTION, SOLUTION INFILTRATION; PERINEURAL at 13:44

## 2021-07-09 LAB
CYTO UR: NORMAL
LAB AP CASE REPORT: NORMAL
LAB AP DIAGNOSIS COMMENT: NORMAL
LAB AP FLOW CYTOMETRY SUMMARY: NORMAL
PATH REPORT.FINAL DX SPEC: NORMAL
PATH REPORT.GROSS SPEC: NORMAL

## 2021-09-02 ENCOUNTER — TELEPHONE (OUTPATIENT)
Dept: CARDIOLOGY CLINIC | Age: 76
End: 2021-09-02

## 2021-09-02 NOTE — TELEPHONE ENCOUNTER
Called to r/s appointment  [facility] office closing please r/s patient next available appointment with [facility]

## 2021-09-16 LAB
CYTO UR: NORMAL
LAB AP CASE REPORT: NORMAL
PATH REPORT.FINAL DX SPEC: NORMAL
PATH REPORT.GROSS SPEC: NORMAL

## 2021-10-22 RX ORDER — AMLODIPINE BESYLATE 5 MG/1
TABLET ORAL
Qty: 90 TABLET | Refills: 0 | Status: SHIPPED | OUTPATIENT
Start: 2021-10-22 | End: 2021-10-27 | Stop reason: SDUPTHER

## 2021-10-27 ENCOUNTER — OFFICE VISIT (OUTPATIENT)
Dept: CARDIOLOGY CLINIC | Age: 76
End: 2021-10-27
Payer: MEDICARE

## 2021-10-27 VITALS
DIASTOLIC BLOOD PRESSURE: 70 MMHG | SYSTOLIC BLOOD PRESSURE: 118 MMHG | WEIGHT: 143 LBS | BODY MASS INDEX: 26.31 KG/M2 | HEART RATE: 75 BPM | HEIGHT: 62 IN

## 2021-10-27 DIAGNOSIS — E78.2 MIXED HYPERLIPIDEMIA: ICD-10-CM

## 2021-10-27 DIAGNOSIS — I25.10 CORONARY ARTERY DISEASE INVOLVING NATIVE CORONARY ARTERY OF NATIVE HEART WITHOUT ANGINA PECTORIS: Primary | ICD-10-CM

## 2021-10-27 DIAGNOSIS — I10 ESSENTIAL HYPERTENSION: ICD-10-CM

## 2021-10-27 PROCEDURE — 99214 OFFICE O/P EST MOD 30 MIN: CPT | Performed by: CLINICAL NURSE SPECIALIST

## 2021-10-27 RX ORDER — CLOPIDOGREL BISULFATE 75 MG/1
75 TABLET ORAL DAILY
Qty: 90 TABLET | Refills: 3 | Status: SHIPPED | OUTPATIENT
Start: 2021-10-27

## 2021-10-27 RX ORDER — ISOSORBIDE MONONITRATE 30 MG/1
30 TABLET, EXTENDED RELEASE ORAL DAILY
Qty: 90 TABLET | Refills: 3 | Status: SHIPPED | OUTPATIENT
Start: 2021-10-27 | End: 2022-10-27 | Stop reason: SDUPTHER

## 2021-10-27 RX ORDER — AMLODIPINE BESYLATE 5 MG/1
TABLET ORAL
Qty: 90 TABLET | Refills: 3 | Status: SHIPPED | OUTPATIENT
Start: 2021-10-27

## 2021-10-27 NOTE — PROGRESS NOTES
[facility] Cardiology  [address] [place] [address] [address], [address] [address]  Phone: [phone]  Fax: [phone]    OFFICE VISIT:  10/27/2021    Raf Ibrahim - : 1945    Reason For Visit:  Vishnu Ambrosio is a 77 y.o. female who is here for 1 Year Follow Up (no cardiac symptoms), Hypertension, Coronary Artery Disease, and Hyperlipidemia  History of hypertension, hyperlipidemia, coronary disease with CABG   Patient had stenting to RCA, OM and LIMA to the LAD in   Had negative stress echo 2017    Patient was diagnosed with multimyeloma. She states she is being treated with weekly chemotherapy in [place] with oncologist.  Alysa Rank for second opinion at [facility] but is on appropriate treatment. Started having some epigastric pain. Had urgent cholesystecomy 10 days ago . Oertli chemotherapy is on hold as she recovers from surgery. She is starting to feel pretty good. She states 2 days after chemotherapy she is down for a few days but then recovers and is back busy. She states there is no cure but will continue treatment and live as long as she possibly can well. 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 She has developed some lower back pain. Thinks it is from the cancer on her spine    Subjective  Coral denies exertional chest pain, shortness of breath, orthopnea, paroxysmal nocturnal dyspnea, syncope, presyncope, arrhythmia, edema and fatigue. The patient denies numbness or weakness to suggest cerebrovascular accident or transient ischemic attack. Marshall Baeza DO is PCP and follows labs.   Raf Ibrahim has the following history as recorded in [facility]:    Patient Active Problem List    Diagnosis Date Noted    Chronic right-sided low back pain without sciatica 2018    Sacroiliac joint dysfunction of right side 2018    Trochanteric bursitis of right hip 2018    Muscle spasm of back 2018    Overweight (BMI 25.0-29.9) 2017    Renal cyst 2017    Right hip pain 2017    CAD (coronary artery disease)     Hypertension     Hyperlipidemia      Past Medical History:   Diagnosis Date    CAD (coronary artery disease)     Cancer ([facility] [address]     GERD (gastroesophageal reflux disease)     Hyperlipidemia     PCP manages cholesterol/labs    Hypertension     Multiple myeloma ([facility] [address]     Substance abuse ([facility]     Tobacco     Past Surgical History:   Procedure Laterality Date    CARDIAC CATHETERIZATION  12  MDL    EF  60%    CARDIAC CATHETERIZATION  12  MDL    with stenting to RCA   [address] CORONARY ARTERY BYPASS GRAFT  12    LIMA to LAD, sequential SVG to OM and Diag, SVG to PDA    HYSTERECTOMY  1984    Partial    OVARIAN CYST REMOVAL       Family History   Problem Relation Age of Onset    Heart Disease Mother     Heart Disease Father     Stroke Father     Cancer Sister      Social History     Tobacco Use    Smoking status: Former Smoker     Types: Cigarettes     Quit date: 2012     Years since quittin.2    Smokeless tobacco: Never Used   Substance Use Topics    Alcohol use: No      Current Outpatient Medications   Medication Sig Dispense Refill    amLODIPine (NORVASC) 5 MG tablet TAKE 1 TABLET BY MOUTH DAILY 90 tablet 0    HYDROcodone-acetaminophen (NORCO)  MG per tablet Take 1 tablet by mouth every 6 hours as needed for Pain.       atorvastatin (LIPITOR) 40 MG tablet TAKE 1 TABLET BY MOUTH ONCE DAILY 90 tablet 5    isosorbide mononitrate (IMDUR) 30 MG extended release tablet Take 1 tablet by mouth daily 90 tablet 5    metoprolol tartrate (LOPRESSOR) 25 MG tablet Take 0.5 tablets by mouth 2 times daily 90 tablet 5    acetaminophen (TYLENOL) 500 MG tablet Take 1,000 mg by mouth as needed for Pain      clopidogrel (PLAVIX) 75 MG tablet Take 1 tablet by mouth daily 90 tablet 3    nitroGLYCERIN (NITROSTAT) 0.4 MG SL tablet Place 1 tablet under the tongue every 5 minutes as needed for Chest pain 25 tablet 5    famotidine (PEPCID) 20 MG tablet Take 20 mg by mouth daily.  buPROPion (WELLBUTRIN SR) 150 MG extended release tablet  (Patient not taking: Reported on 10/27/2021)       No current facility-administered medications for this visit. Allergies: Asa arthritis strength-antacid [aspirin buff, al hyd-mg hyd]; Ciprofloxacin; Demerol; Ibuprofen; and Novocain [procaine]    Review of Systems  Constitutional - no significant activity change, appetite change, or unexpected weight change. No fever, chills or diaphoresis. No fatigue. HEENT - no significant rhinorrhea or epistaxis. No tinnitus or significant hearing loss. Eyes - no sudden vision change or amaurosis. Respiratory - no significant wheezing, stridor, apnea or cough. No dyspnea on exertion or shortness of breath. Cardiovascular - no exertional chest pain, orthopnea or PND. No sensation of arrhythmia or slow heart rate. No claudication or leg edema. Gastrointestinal - no abdominal swelling or pain. No blood in stool. No severe constipation, diarrhea, nausea, or vomiting. Genitourinary - no difficulty urinating, dysuria, frequency, or urgency. No flank pain or hematuria. Musculoskeletal - + back pain, no  gait disturbance, or myalgia. Skin - no color change or rash. No pallor. No new surgical incision. Neurologic - no speech difficulty, facial asymmetry or lateralizing weakness. No seizures, presyncope, syncope, or significant dizziness. Hematologic - no easy bruising or excessive bleeding. Psychiatric - no severe anxiety or insomnia. No confusion. All other review of systems are negative. Objective  Vital Signs - /70   Pulse 75   Ht 5' 2\" (1.575 m)   Wt 143 lb (64.9 kg)   BMI 26.16 kg/m²   General - Coral is alert, cooperative, and pleasant. Well groomed. No acute distress.     Body habitus is normal.  HEENT - The head is evidence of overt heart failure, angina or dysrhythmia. 30 minutes were spent preparing, reviewing and seeing patient. All questions answered    Plan    Follow up in 1  year   Follow up with oncology as planned. Call with any questions or concerns  Follow up with Alexa Moran,  for non cardiac problems  Report any new problems  Cardiovascular Fitness-Exercise as tolerated. Strive for 30 minutes of exercise most days of the week. Cardiac / Healthy Diet  Continue current medications as directed  Continue plan of treatment  It is always recommended that you bring your medications bottles with you to each visit - this is for your safety! DEREK Doe    EMR dragon/transcription disclaimer: Much of this encounter note is electronic transcription/translation of spoken language to printed tach. Electronic translation of spoken language may be erroneous, or at times, nonsensical words or phrases may be inadvertently transcribed.  Although, I have reviewed the note for such errors, some may still exist.

## 2021-10-27 NOTE — PATIENT INSTRUCTIONS
Follow up in 1  year   Follow up with oncology as planned. Call with any questions or concerns  Follow up with Lola Machado, DO for non cardiac problems  Report any new problems  Cardiovascular Fitness-Exercise as tolerated. Strive for 30 minutes of exercise most days of the week. Cardiac / Healthy Diet  Continue current medications as directed  Continue plan of treatment  It is always recommended that you bring your medications bottles with you to each visit - this is for your safety!

## 2022-02-24 DIAGNOSIS — I25.10 CORONARY ARTERY DISEASE INVOLVING NATIVE CORONARY ARTERY OF NATIVE HEART WITHOUT ANGINA PECTORIS: ICD-10-CM

## 2022-02-24 DIAGNOSIS — E78.2 MIXED HYPERLIPIDEMIA: ICD-10-CM

## 2022-02-24 RX ORDER — ATORVASTATIN CALCIUM 40 MG/1
TABLET, FILM COATED ORAL
Qty: 90 TABLET | Refills: 5 | OUTPATIENT
Start: 2022-02-24

## 2022-06-01 DIAGNOSIS — I25.10 CORONARY ARTERY DISEASE INVOLVING NATIVE CORONARY ARTERY OF NATIVE HEART WITHOUT ANGINA PECTORIS: ICD-10-CM

## 2022-06-01 DIAGNOSIS — E78.2 MIXED HYPERLIPIDEMIA: ICD-10-CM

## 2022-06-01 DIAGNOSIS — E78.2 MIXED HYPERLIPIDEMIA: Primary | ICD-10-CM

## 2022-06-01 RX ORDER — ATORVASTATIN CALCIUM 40 MG/1
TABLET, FILM COATED ORAL
Qty: 90 TABLET | Refills: 1 | Status: SHIPPED | OUTPATIENT
Start: 2022-06-01

## 2022-06-01 NOTE — TELEPHONE ENCOUNTER
Spoke with patient and advised labs are needed for cholesterol monitoring. She would like to have them done at [facility]. Advised I would fax over orders. She voiced understanding.

## 2022-10-24 DIAGNOSIS — I25.10 CORONARY ARTERY DISEASE INVOLVING NATIVE CORONARY ARTERY OF NATIVE HEART WITHOUT ANGINA PECTORIS: ICD-10-CM

## 2022-10-24 RX ORDER — ISOSORBIDE MONONITRATE 30 MG/1
30 TABLET, EXTENDED RELEASE ORAL DAILY
Qty: 90 TABLET | Refills: 3 | OUTPATIENT
Start: 2022-10-24

## 2022-10-26 ENCOUNTER — TELEPHONE (OUTPATIENT)
Dept: CARDIOLOGY CLINIC | Age: 77
End: 2022-10-26

## 2022-10-27 ENCOUNTER — OFFICE VISIT (OUTPATIENT)
Dept: CARDIOLOGY CLINIC | Age: 77
End: 2022-10-27
Payer: MEDICARE

## 2022-10-27 VITALS
SYSTOLIC BLOOD PRESSURE: 118 MMHG | BODY MASS INDEX: 27.42 KG/M2 | DIASTOLIC BLOOD PRESSURE: 68 MMHG | HEIGHT: 62 IN | HEART RATE: 56 BPM | WEIGHT: 149 LBS

## 2022-10-27 DIAGNOSIS — I25.118 CORONARY ARTERY DISEASE OF NATIVE ARTERY OF NATIVE HEART WITH STABLE ANGINA PECTORIS (HCC): Primary | ICD-10-CM

## 2022-10-27 DIAGNOSIS — I10 ESSENTIAL HYPERTENSION: ICD-10-CM

## 2022-10-27 DIAGNOSIS — I20.9 ANGINA PECTORIS (HCC): ICD-10-CM

## 2022-10-27 DIAGNOSIS — E78.2 MIXED HYPERLIPIDEMIA: ICD-10-CM

## 2022-10-27 PROCEDURE — 3074F SYST BP LT 130 MM HG: CPT | Performed by: CLINICAL NURSE SPECIALIST

## 2022-10-27 PROCEDURE — 99214 OFFICE O/P EST MOD 30 MIN: CPT | Performed by: CLINICAL NURSE SPECIALIST

## 2022-10-27 PROCEDURE — 3078F DIAST BP <80 MM HG: CPT | Performed by: CLINICAL NURSE SPECIALIST

## 2022-10-27 PROCEDURE — 1123F ACP DISCUSS/DSCN MKR DOCD: CPT | Performed by: CLINICAL NURSE SPECIALIST

## 2022-10-27 PROCEDURE — 93000 ELECTROCARDIOGRAM COMPLETE: CPT | Performed by: CLINICAL NURSE SPECIALIST

## 2022-10-27 RX ORDER — NITROGLYCERIN 0.4 MG/1
0.4 TABLET SUBLINGUAL EVERY 5 MIN PRN
Qty: 25 TABLET | Refills: 5 | Status: SHIPPED | OUTPATIENT
Start: 2022-10-27

## 2022-10-27 RX ORDER — ISOSORBIDE MONONITRATE 30 MG/1
30 TABLET, EXTENDED RELEASE ORAL DAILY
Qty: 90 TABLET | Refills: 3 | Status: SHIPPED | OUTPATIENT
Start: 2022-10-27

## 2022-10-27 NOTE — PATIENT INSTRUCTIONS
Labs soon with other labs   Stress test soon   Take your isosorbide in the AM      How to take:  NITROGLYCERIN (Nitrostat) 0.4 mg tablets, sublingual.  Nitroglycerin is in a group of drugs called nitrates. Nitroglycerin dilates (widens) blood vessels, making it easier for blood to flow through them and easier for the heart to pump. Dosing Guidelines for Nitroglycerin Tablets  At the start of an angina (chest pain) attack, place one tablet under the tongue or between the cheek and gum. Do not swallow or chew the tablet; let it dissolve on its own. If necessary, a second and third tablet may be used, with five minutes between using each tablet. If you use a third tablet and your chest pain continues, it is time to seek immediate medical attention. Call 911 immediately and have someone drive you to the emergency room. You may be having a heart attack or other serious heart problem. To prevent angina from exercise or stress, use 1 tablet 5 to 10 minutes before the activity. Follow up in 1  year   Follow up with oncology as planned. Call with any questions or concerns  Follow up with Hung Foster DO for non cardiac problems  Report any new problems  Cardiovascular Fitness-Exercise as tolerated. Strive for 30 minutes of exercise most days of the week. Cardiac / Healthy Diet  Continue current medications as directed  Continue plan of treatment  It is always recommended that you bring your medications bottles with you to each visit - this is for your safety!

## 2022-10-27 NOTE — PROGRESS NOTES
[facility]  [address] [place] [address] [address] Kevin [address], [address] [id number]  Phone: [phone]  Fax: [phone]    OFFICE VISIT:  10/27/2022    Jordi Nicholson - : 1945    Reason For Visit:  Jada Escobar is a 77 y.o. female who is here for 1 Year Follow Up (Pt has chest  and jaw pain with exertion), Coronary Artery Disease, Hyperlipidemia, and Hypertension  History of hypertension, hyperlipidemia, coronary disease with CABG   Patient had stenting to RCA, OM and LIMA to the LAD in   Had negative stress echo 2017    Patient was diagnosed with multimyeloma last year. She did weekly treatments and now is having them every other week. She reports she gets blood work prior to her treatment and has had some electrolyte abnormalities with replacements. She has pain in her lower back and right hip due to metastatic disease. She has pain medicine to take it just as needed    She reports this summer she had 2 episodes of significant dizziness post treatment but that is improved    Her last couple months she has noticed some angina. Most recent episode was yesterday. She has been trying to stay busy and active. She was working in the yard and raking some leaves. She developed chest pain and pressure that radiated to her jaw. She went inside and it relieved after about 15 to 20 minutes. She did not take any nitroglycerin as she does not have any fresh    She went back outside and resumed activities but went slower and did not rake any more      Subjective  Jada Escobar denies resting shortness of breath, orthopnea, paroxysmal nocturnal dyspnea, syncope, presyncope, arrhythmia, edema and fatigue. The patient denies numbness or weakness to suggest cerebrovascular accident or transient ischemic attack. Fay De Guzman DO is PCP and follows labs.   Jordi Nicholson has the following history as recorded in [facility]:    Patient Active Problem List    Diagnosis Date Noted    Chronic right-sided low back pain without sciatica 05/21/2018    Sacroiliac joint dysfunction of right side 05/21/2018    Trochanteric bursitis of right hip 05/21/2018    Muscle spasm of back 05/21/2018    Overweight (BMI 25.0-29.9) 06/06/2017    Renal cyst 06/06/2017    Right hip pain 06/06/2017    CAD (coronary artery disease)     Hypertension     Hyperlipidemia      Past Medical History:   Diagnosis Date    CAD (coronary artery disease)     Cancer ([facility] [address]     GERD (gastroesophageal reflux disease)     Hyperlipidemia     PCP manages cholesterol/labs    Hypertension     Multiple myeloma ([facility] [address]     Substance abuse ([facility] [address]     Tobacco     Past Surgical History:   Procedure Laterality Date    CARDIAC CATHETERIZATION  8/1/12  MDL    EF  60%    CARDIAC CATHETERIZATION  8/7/12  MDL    with stenting to RCA    [address] GRAFT  08/02/2012    LIMA to LAD, sequential SVG to OM and Diag, SVG to PDA    HYSTERECTOMY (CERVIX STATUS UNKNOWN)  1984    Partial    OVARIAN CYST REMOVAL  1990     Family History   Problem Relation Age of Onset    Heart Disease Mother     Heart Disease Father     Stroke Father     Cancer Sister      Social History     Tobacco Use    Smoking status: Former     Types: Cigarettes     Quit date: 8/1/2012     Years since quitting: 10.2    Smokeless tobacco: Never   Substance Use Topics    Alcohol use: No      Current Outpatient Medications   Medication Sig Dispense Refill    nitroGLYCERIN (NITROSTAT) 0.4 MG SL tablet Place 1 tablet under the tongue every 5 minutes as needed for Chest pain 25 tablet 5    isosorbide mononitrate (IMDUR) 30 MG extended release tablet Take 1 tablet by mouth daily 90 tablet 3    atorvastatin (LIPITOR) 40 MG tablet TAKE 1 TABLET BY MOUTH ONCE DAILY 90 tablet 1    amLODIPine (NORVASC) 5 MG tablet TAKE 1 TABLET BY MOUTH DAILY 90 tablet 3    clopidogrel (PLAVIX) 75 MG tablet Take 1 tablet by mouth daily 90 tablet 3    metoprolol tartrate (LOPRESSOR) 25 MG tablet Take 0.5 tablets by mouth 2 times daily 90 tablet 3    HYDROcodone-acetaminophen (NORCO)  MG per tablet Take 1 tablet by mouth every 6 hours as needed for Pain. acetaminophen (TYLENOL) 500 MG tablet Take 1,000 mg by mouth as needed for Pain      famotidine (PEPCID) 20 MG tablet Take 20 mg by mouth daily. No current facility-administered medications for this visit. Allergies: Asa arthritis strength-antacid [aspirin buff, al hyd-mg hyd]; Ciprofloxacin; Demerol; Ibuprofen; and Novocain [procaine]    Review of Systems  Constitutional - no significant activity change, appetite change, or unexpected weight change. No fever, chills or diaphoresis. No fatigue. HEENT - no significant rhinorrhea or epistaxis. No tinnitus or significant hearing loss. Eyes - no sudden vision change or amaurosis. Respiratory - no significant wheezing, stridor, apnea or cough. No dyspnea on exertion or shortness of breath. Cardiovascular - + chest pain, on orthopnea or PND. No sensation of arrhythmia or slow heart rate. No claudication or leg edema. Gastrointestinal - no abdominal swelling or pain. No blood in stool. No severe constipation, diarrhea, nausea, or vomiting. Genitourinary - no difficulty urinating, dysuria, frequency, or urgency. No flank pain or hematuria. Musculoskeletal - + back pain,  gait -uses cane. No recent falls . Skin - no color change or rash. No pallor. No new surgical incision. Neurologic - no speech difficulty, facial asymmetry or lateralizing weakness. No seizures, presyncope, syncope, or significant dizziness. Hematologic - no easy bruising or excessive bleeding. Psychiatric - no severe anxiety or insomnia. No confusion. All other review of systems are negative. Objective  Vital Signs - /68   Pulse 56   Ht 5' 2\" (1.575 m)   Wt 149 lb (67.6 kg)   BMI 27.25 kg/m²   General - Coral is alert, cooperative, and pleasant.   Well groomed. No acute distress. Body habitus is normal.  HEENT - The head is normocephalic. No circumoral cyanosis. Dentition is normal.   EYES -  No Xanthelasma, no arcus senilis, no conjunctival hemorrhages or discharge. Neck - Supple, without increased jugular venous pressures. No carotid bruits. No mass. Respiratory - Lungs are clear bilaterally. No wheezes or rales. Normal effort without use of accessory muscles. Cardiovascular - Heart has regular rhythm and rate. No murmurs, rubs or gallops. + pedal pulses and no varicosities. Abdominal -  Soft, nontender, nondistended. Bowel sounds are intact. Extremities - No clubbing, cyanosis, or  edema. Musculoskeletal -  No clubbing . No Osler's nodes. Gait normal .  No kyphosis or scoliosis. Skin -  no statis ulcers or dermatitis. Neurological - No focal signs are identified. Oriented to person, place and time. Psychiatric -  Appropriate affect and mood. Assessment:     Diagnosis Orders   1. Coronary artery disease of native artery of native heart with stable angina pectoris (HCC)  EKG 12 lead    ECHO Pharmacological Stress Test      2. Mixed hyperlipidemia  Lipid Panel    Comprehensive Metabolic Panel      3. Essential hypertension  EKG 12 lead      4. Angina pectoris (HCC)  nitroGLYCERIN (NITROSTAT) 0.4 MG SL tablet    ECHO Pharmacological Stress Test        Data:  BP Readings from Last 3 Encounters:   10/27/22 118/68   10/27/21 118/70   04/30/21 122/82    Pulse Readings from Last 3 Encounters:   10/27/22 56   10/27/21 75   04/30/21 76        Wt Readings from Last 3 Encounters:   10/27/22 149 lb (67.6 kg)   10/27/21 143 lb (64.9 kg)   04/30/21 156 lb (70.8 kg)   EKG today shows sinus bradycardia with a rate of 56. No change from previous EKG    Blood pressure heart rate well controlled. Medical management includes beta-blocker, long-acting nitrate and CCB.   Remains on Plavix and statin      PCP and oncology are managing her labs.   States taking medications as prescribed    Patient is on long-acting nitrate but takes it at nighttime. With her increase in exertional anginal symptoms we will have her take it during the day. We discussed signs and symptoms of when to take as needed nitroglycerin. We will call her in some fresh tablets    We will get her set up for dobutamine stress echo. With hip and back pain has not been quite as active and would not be able to walk treadmill. If that is okay we will just plan on seeing her back yearly. Stable cardiovascular status. No evidence of overt heart failure, angina or dysrhythmia. 30 minutes were spent preparing, reviewing and seeing patient. All questions answered    Plan    Labs soon with other labs   Stress test soon   Take your isosorbide in the AM      How to take:  NITROGLYCERIN (Nitrostat) 0.4 mg tablets, sublingual.  Nitroglycerin is in a group of drugs called nitrates. Nitroglycerin dilates (widens) blood vessels, making it easier for blood to flow through them and easier for the heart to pump. Dosing Guidelines for Nitroglycerin Tablets  At the start of an angina (chest pain) attack, place one tablet under the tongue or between the cheek and gum. Do not swallow or chew the tablet; let it dissolve on its own. If necessary, a second and third tablet may be used, with five minutes between using each tablet. If you use a third tablet and your chest pain continues, it is time to seek immediate medical attention. Call 911 immediately and have someone drive you to the emergency room. You may be having a heart attack or other serious heart problem. To prevent angina from exercise or stress, use 1 tablet 5 to 10 minutes before the activity. Follow up in 1  year   Follow up with oncology as planned. Call with any questions or concerns  Follow up with Rogerio Mayorga DO for non cardiac problems  Report any new problems  Cardiovascular Fitness-Exercise as tolerated. Strive for 30 minutes of exercise most days of the week. Cardiac / Healthy Diet  Continue current medications as directed  Continue plan of treatment  It is always recommended that you bring your medications bottles with you to each visit - this is for your safety! DEREK Lizama dragon/transcription disclaimer: Much of this encounter note is electronic transcription/translation of spoken language to printed tach. Electronic translation of spoken language may be erroneous, or at times, nonsensical words or phrases may be inadvertently transcribed.  Although, I have reviewed the note for such errors, some may still exist.

## 2022-10-29 DIAGNOSIS — I10 ESSENTIAL HYPERTENSION: ICD-10-CM

## 2022-11-11 ENCOUNTER — HOSPITAL ENCOUNTER (OUTPATIENT)
Dept: NON INVASIVE DIAGNOSTICS | Age: 77
Discharge: HOME | End: 2022-11-11
Payer: MEDICARE

## 2022-11-11 VITALS
SYSTOLIC BLOOD PRESSURE: 186 MMHG | WEIGHT: 147 LBS | DIASTOLIC BLOOD PRESSURE: 74 MMHG | HEART RATE: 71 BPM | BODY MASS INDEX: 26.89 KG/M2

## 2022-11-11 DIAGNOSIS — I25.118 CORONARY ARTERY DISEASE OF NATIVE ARTERY OF NATIVE HEART WITH STABLE ANGINA PECTORIS (HCC): ICD-10-CM

## 2022-11-11 DIAGNOSIS — I20.9 ANGINA PECTORIS (HCC): ICD-10-CM

## 2022-11-11 PROCEDURE — 2500000003 HC RX 250 WO HCPCS: Performed by: INTERNAL MEDICINE

## 2022-11-11 PROCEDURE — 2580000003 HC RX 258: Performed by: INTERNAL MEDICINE

## 2022-11-11 PROCEDURE — 93350 STRESS TTE ONLY: CPT

## 2022-11-11 PROCEDURE — 6360000002 HC RX W HCPCS: Performed by: INTERNAL MEDICINE

## 2022-11-11 RX ORDER — SODIUM CHLORIDE 9 MG/ML
INJECTION, SOLUTION INTRAVENOUS
Status: COMPLETED | OUTPATIENT
Start: 2022-11-11 | End: 2022-11-11

## 2022-11-11 RX ORDER — METOPROLOL TARTRATE 5 MG/5ML
5 INJECTION INTRAVENOUS PRN
Status: DISCONTINUED | OUTPATIENT
Start: 2022-11-11 | End: 2022-11-12 | Stop reason: HOSPADM

## 2022-11-11 RX ORDER — DOBUTAMINE HYDROCHLORIDE 100 MG/100ML
10-50 INJECTION INTRAVENOUS CONTINUOUS
Status: DISCONTINUED | OUTPATIENT
Start: 2022-11-11 | End: 2022-11-13 | Stop reason: HOSPADM

## 2022-11-11 RX ORDER — ATROPINE SULFATE 0.1 MG/ML
1 INJECTION INTRAVENOUS PRN
Status: DISCONTINUED | OUTPATIENT
Start: 2022-11-11 | End: 2022-11-12 | Stop reason: HOSPADM

## 2022-11-11 RX ORDER — SODIUM CHLORIDE 0.9 % (FLUSH) 0.9 %
5-40 SYRINGE (ML) INJECTION PRN
Status: DISCONTINUED | OUTPATIENT
Start: 2022-11-11 | End: 2022-11-12 | Stop reason: HOSPADM

## 2022-11-11 RX ADMIN — DOBUTAMINE HYDROCHLORIDE 10 MCG/KG/MIN: 100 INJECTION INTRAVENOUS at 09:37

## 2022-11-11 RX ADMIN — METOPROLOL TARTRATE 4 MG: 5 INJECTION INTRAVENOUS at 09:45

## 2022-11-11 RX ADMIN — SODIUM CHLORIDE: 9 INJECTION, SOLUTION INTRAVENOUS at 09:37

## 2022-11-21 DIAGNOSIS — I25.10 CORONARY ARTERY DISEASE INVOLVING NATIVE CORONARY ARTERY OF NATIVE HEART WITHOUT ANGINA PECTORIS: ICD-10-CM

## 2022-11-21 DIAGNOSIS — E78.2 MIXED HYPERLIPIDEMIA: ICD-10-CM

## 2022-11-21 RX ORDER — ATORVASTATIN CALCIUM 40 MG/1
TABLET, FILM COATED ORAL
Qty: 90 TABLET | Refills: 1 | OUTPATIENT
Start: 2022-11-21

## 2022-12-09 DIAGNOSIS — E78.2 MIXED HYPERLIPIDEMIA: ICD-10-CM

## 2022-12-09 DIAGNOSIS — I25.10 CORONARY ARTERY DISEASE INVOLVING NATIVE CORONARY ARTERY OF NATIVE HEART WITHOUT ANGINA PECTORIS: ICD-10-CM

## 2022-12-12 RX ORDER — ATORVASTATIN CALCIUM 40 MG/1
TABLET, FILM COATED ORAL
Qty: 90 TABLET | Refills: 3 | Status: SHIPPED | OUTPATIENT
Start: 2022-12-12

## 2022-12-13 RX ORDER — ISOSORBIDE MONONITRATE 30 MG/1
60 TABLET, EXTENDED RELEASE ORAL DAILY
Qty: 90 TABLET | Refills: 3 | Status: SHIPPED | OUTPATIENT
Start: 2022-12-13

## 2022-12-19 DIAGNOSIS — I25.10 CORONARY ARTERY DISEASE INVOLVING NATIVE CORONARY ARTERY OF NATIVE HEART WITHOUT ANGINA PECTORIS: ICD-10-CM

## 2022-12-19 DIAGNOSIS — I10 ESSENTIAL HYPERTENSION: ICD-10-CM

## 2022-12-19 RX ORDER — CLOPIDOGREL BISULFATE 75 MG/1
75 TABLET ORAL DAILY
Qty: 90 TABLET | Refills: 3 | Status: SHIPPED | OUTPATIENT
Start: 2022-12-19

## 2023-01-20 RX ORDER — AMLODIPINE BESYLATE 5 MG/1
TABLET ORAL
Qty: 90 TABLET | Refills: 3 | Status: SHIPPED | OUTPATIENT
Start: 2023-01-20

## 2023-11-20 RX ORDER — ISOSORBIDE MONONITRATE 30 MG/1
60 TABLET, EXTENDED RELEASE ORAL DAILY
Qty: 180 TABLET | OUTPATIENT
Start: 2023-11-20

## 2024-01-09 ENCOUNTER — TRANSCRIBE ORDERS (OUTPATIENT)
Dept: ADMINISTRATIVE | Facility: HOSPITAL | Age: 79
End: 2024-01-09
Payer: MEDICARE

## 2024-01-09 DIAGNOSIS — C90.00 MULTIPLE MYELOMA, REMISSION STATUS UNSPECIFIED: Primary | ICD-10-CM

## 2024-01-16 ENCOUNTER — TRANSCRIBE ORDERS (OUTPATIENT)
Dept: ADMINISTRATIVE | Age: 79
End: 2024-01-16

## 2024-01-16 DIAGNOSIS — C90.00 MULTIPLE MYELOMA NOT HAVING ACHIEVED REMISSION (HCC): Primary | ICD-10-CM

## 2024-01-22 ENCOUNTER — TELEPHONE (OUTPATIENT)
Dept: INTERVENTIONAL RADIOLOGY/VASCULAR | Age: 79
End: 2024-01-22

## 2024-01-23 ENCOUNTER — HOSPITAL ENCOUNTER (OUTPATIENT)
Dept: CT IMAGING | Age: 79
Discharge: HOME | End: 2024-01-23
Payer: MEDICARE

## 2024-01-23 VITALS
HEART RATE: 55 BPM | SYSTOLIC BLOOD PRESSURE: 117 MMHG | RESPIRATION RATE: 16 BRPM | DIASTOLIC BLOOD PRESSURE: 75 MMHG | WEIGHT: 165 LBS | BODY MASS INDEX: 30.36 KG/M2 | HEIGHT: 62 IN | OXYGEN SATURATION: 99 % | TEMPERATURE: 97.3 F

## 2024-01-23 DIAGNOSIS — C90.00 MULTIPLE MYELOMA NOT HAVING ACHIEVED REMISSION (HCC): ICD-10-CM

## 2024-01-23 LAB
BASOPHILS # BLD: 0 K/UL (ref 0–0.2)
BASOPHILS NFR BLD: 0.8 % (ref 0–1)
EOSINOPHIL # BLD: 0.1 K/UL (ref 0–0.6)
EOSINOPHIL NFR BLD: 2.3 % (ref 0–5)
ERYTHROCYTE [DISTWIDTH] IN BLOOD BY AUTOMATED COUNT: 15.5 % (ref 11.5–14.5)
HCT VFR BLD AUTO: 39.1 % (ref 37–47)
HGB BLD-MCNC: 12.2 G/DL (ref 12–16)
IMM GRANULOCYTES # BLD: 0 K/UL
INR PPP: 0.99 (ref 0.88–1.18)
LYMPHOCYTES # BLD: 0.9 K/UL (ref 1.1–4.5)
LYMPHOCYTES NFR BLD: 17.4 % (ref 20–40)
MCH RBC QN AUTO: 24 PG (ref 27–31)
MCHC RBC AUTO-ENTMCNC: 31.2 G/DL (ref 33–37)
MCV RBC AUTO: 77 FL (ref 81–99)
MONOCYTES # BLD: 0.7 K/UL (ref 0–0.9)
MONOCYTES NFR BLD: 12.5 % (ref 0–10)
NEUTROPHILS # BLD: 3.5 K/UL (ref 1.5–7.5)
NEUTS SEG NFR BLD: 66.6 % (ref 50–65)
PLATELET # BLD AUTO: 150 K/UL (ref 130–400)
PMV BLD AUTO: 9.2 FL (ref 9.4–12.3)
PROTHROMBIN TIME: 12.8 SEC (ref 12–14.6)
RBC # BLD AUTO: 5.08 M/UL (ref 4.2–5.4)
WBC # BLD AUTO: 5.2 K/UL (ref 4.8–10.8)

## 2024-01-23 PROCEDURE — 88305 TISSUE EXAM BY PATHOLOGIST: CPT

## 2024-01-23 PROCEDURE — 88341 IMHCHEM/IMCYTCHM EA ADD ANTB: CPT

## 2024-01-23 PROCEDURE — 85025 COMPLETE CBC W/AUTO DIFF WBC: CPT

## 2024-01-23 PROCEDURE — 88342 IMHCHEM/IMCYTCHM 1ST ANTB: CPT

## 2024-01-23 PROCEDURE — 85610 PROTHROMBIN TIME: CPT

## 2024-01-23 PROCEDURE — 77012 CT SCAN FOR NEEDLE BIOPSY: CPT

## 2024-01-23 PROCEDURE — 88313 SPECIAL STAINS GROUP 2: CPT

## 2024-01-23 PROCEDURE — 6360000002 HC RX W HCPCS: Performed by: RADIOLOGY

## 2024-01-23 PROCEDURE — 88311 DECALCIFY TISSUE: CPT

## 2024-01-23 RX ORDER — MIDAZOLAM HYDROCHLORIDE 2 MG/2ML
INJECTION, SOLUTION INTRAMUSCULAR; INTRAVENOUS PRN
Status: COMPLETED | OUTPATIENT
Start: 2024-01-23 | End: 2024-01-23

## 2024-01-23 RX ORDER — FENTANYL CITRATE 50 UG/ML
INJECTION, SOLUTION INTRAMUSCULAR; INTRAVENOUS PRN
Status: COMPLETED | OUTPATIENT
Start: 2024-01-23 | End: 2024-01-23

## 2024-01-23 RX ADMIN — FENTANYL CITRATE 50 MCG: 50 INJECTION, SOLUTION INTRAMUSCULAR; INTRAVENOUS at 08:40

## 2024-01-23 RX ADMIN — MIDAZOLAM HYDROCHLORIDE 1 MG: 1 INJECTION, SOLUTION INTRAMUSCULAR; INTRAVENOUS at 08:40

## 2024-01-23 RX ADMIN — FENTANYL CITRATE 50 MCG: 50 INJECTION, SOLUTION INTRAMUSCULAR; INTRAVENOUS at 08:55

## 2024-01-23 RX ADMIN — MIDAZOLAM HYDROCHLORIDE 1 MG: 1 INJECTION, SOLUTION INTRAMUSCULAR; INTRAVENOUS at 08:55

## 2024-01-23 ASSESSMENT — PAIN SCALES - GENERAL
PAINLEVEL_OUTOF10: 0
PAINLEVEL_OUTOF10: 0

## 2024-01-23 ASSESSMENT — PAIN - FUNCTIONAL ASSESSMENT
PAIN_FUNCTIONAL_ASSESSMENT: NONE - DENIES PAIN

## 2024-01-23 NOTE — PROGRESS NOTES
Date of the Proposed Procedure:   1/23/2024     Proposed Procedure:  Bone Marrow Biopsy    Radiologist:  Asim Caceres MD    Indications:abnormal lab results    American Society of Anesthesiologists (ASA) Classification:  ASA 2 - Patient with mild systemic disease with no functional limitations    Plan of Sedation:  Moderate Sedation    Mallampati Classification: II (soft palate, uvula, fauces visible)    Prior to procedure:  I have reviewed the recent H&P from the referring physician, or other provider, related to ordered procedure. No interval changes were found upon examination/review since the original History and Physical / Consult Note.    I have performed a pre-procedure assessment of this patient on 1/23/2024, in the CT procedure room, in the presence of a  Registered Nurse and CT Tech. I discussed with the patient,  in detail,  the risks, benefits, and alternatives to this procedure.  Patient/Guardian is aware there are risks associated with moderate sedation which includes transient drop in blood pressure and need for assisted ventilation. EBL: .Less than 10ml    Plan for discharge:  Discharge patient after post procedure ordered recovery time.  Post sigrid score at pre-procedure baseline, score of at least 8 prior to discharge.      Asim Caceres MD  1/23/20248:42 AM

## 2024-01-23 NOTE — PROGRESS NOTES
Patient is here today for a ct guided bone marrow biopsy.  Procedure verified and patient escorted to room 2  in [facility] and patient oriented to room and call light.  Procedure explained as well as d/c instructions and patient verbalized understanding of instruction.  Patient daughter here with her today in room.  H/P on chart and was faxed tp pathology for review.  Radiologist updated on patient status.  CT tech Jada notified of patient status.  Iv started and labs drawn and resulted.  Assessment completed.  Vitals stable.  Medications and allergies reviewed

## 2024-02-17 DIAGNOSIS — E78.2 MIXED HYPERLIPIDEMIA: ICD-10-CM

## 2024-02-17 DIAGNOSIS — I25.10 CORONARY ARTERY DISEASE INVOLVING NATIVE CORONARY ARTERY OF NATIVE HEART WITHOUT ANGINA PECTORIS: ICD-10-CM

## 2024-02-20 RX ORDER — ATORVASTATIN CALCIUM 40 MG/1
TABLET, FILM COATED ORAL
Qty: 90 TABLET | Refills: 3 | OUTPATIENT
Start: 2024-02-20

## (undated) DEVICE — SPK10277 JACKSON/PRO-AXIS KIT: Brand: SPK10277 JACKSON/PRO-AXIS KIT

## (undated) DEVICE — CVR UNIV C/ARM

## (undated) DEVICE — PK TURNOVER RM ADV

## (undated) DEVICE — PROBE OCP215 OSTEOCOOL RF 17G 15MMX2: Brand: OSTEOCOOL™ RF ABLATION SYSTEM

## (undated) DEVICE — CONTAINER,SPECIMEN,OR STERILE,4OZ: Brand: MEDLINE

## (undated) DEVICE — GLV SURG BIOGEL LTX PF 6 1/2

## (undated) DEVICE — PK KYPHOPLASTY 30

## (undated) DEVICE — VAGINAL PREP TRAY: Brand: MEDLINE INDUSTRIES, INC.

## (undated) DEVICE — BONE TAMP KIT KPX203PB FF X2 20/3 1 STP: Brand: KYPHOPAK™ TRAY

## (undated) DEVICE — TOWEL,OR,DSP,ST,BLUE,STD,4/PK,20PK/CS: Brand: MEDLINE

## (undated) DEVICE — GLV SURG BIOGEL LTX PF 8

## (undated) DEVICE — SUT MNCRYL 4/0 PS2 27IN UD MCP426H

## (undated) DEVICE — CONN FLX BREATHE CIRCT

## (undated) DEVICE — BONE BIOPSY DEVICE F05A BBD SIZE 3: Brand: MEDTRONIC REUSABLE INSTRUMENTS